# Patient Record
Sex: MALE | Race: WHITE | NOT HISPANIC OR LATINO | Employment: OTHER | URBAN - METROPOLITAN AREA
[De-identification: names, ages, dates, MRNs, and addresses within clinical notes are randomized per-mention and may not be internally consistent; named-entity substitution may affect disease eponyms.]

---

## 2018-11-02 ENCOUNTER — HISTORICAL (OUTPATIENT)
Dept: ADMINISTRATIVE | Facility: HOSPITAL | Age: 38
End: 2018-11-02

## 2018-11-16 ENCOUNTER — HISTORICAL (OUTPATIENT)
Dept: ADMINISTRATIVE | Facility: HOSPITAL | Age: 38
End: 2018-11-16

## 2019-05-17 ENCOUNTER — HISTORICAL (OUTPATIENT)
Dept: RADIOLOGY | Facility: HOSPITAL | Age: 39
End: 2019-05-17

## 2019-05-17 LAB — POC CREATININE: 2 MG/DL (ref 0.6–1.3)

## 2021-09-02 ENCOUNTER — HISTORICAL (OUTPATIENT)
Dept: RADIOLOGY | Facility: HOSPITAL | Age: 41
End: 2021-09-02

## 2021-09-02 LAB
ABS NEUT (OLG): 5 X10(3)/MCL (ref 2.1–9.2)
ALBUMIN SERPL-MCNC: 2.9 GM/DL (ref 3.5–5)
ALBUMIN/GLOB SERPL: 1 RATIO (ref 1.1–2)
ALP SERPL-CCNC: 132 UNIT/L (ref 40–150)
ALT SERPL-CCNC: 10 UNIT/L (ref 0–55)
AST SERPL-CCNC: 15 UNIT/L (ref 5–34)
BASOPHILS # BLD AUTO: 0 X10(3)/MCL (ref 0–0.2)
BASOPHILS NFR BLD AUTO: 1 %
BILIRUB SERPL-MCNC: 0.1 MG/DL
BILIRUBIN DIRECT+TOT PNL SERPL-MCNC: <0.1 MG/DL (ref 0–0.5)
BILIRUBIN DIRECT+TOT PNL SERPL-MCNC: >0 MG/DL (ref 0–0.8)
BUN SERPL-MCNC: 27.4 MG/DL (ref 8.9–20.6)
CALCIUM SERPL-MCNC: 8.3 MG/DL (ref 8.4–10.2)
CHLORIDE SERPL-SCNC: 108 MMOL/L (ref 98–107)
CO2 SERPL-SCNC: 25 MMOL/L (ref 22–29)
CREAT SERPL-MCNC: 2.49 MG/DL (ref 0.73–1.18)
CREAT UR-MCNC: 139.1 MG/DL (ref 58–161)
EOSINOPHIL # BLD AUTO: 0.2 X10(3)/MCL (ref 0–0.9)
EOSINOPHIL NFR BLD AUTO: 3 %
ERYTHROCYTE [DISTWIDTH] IN BLOOD BY AUTOMATED COUNT: 14.4 % (ref 11.5–17)
FERRITIN SERPL-MCNC: 9.7 NG/ML (ref 21.81–274.66)
GLOBULIN SER-MCNC: 2.9 GM/DL (ref 2.4–3.5)
GLUCOSE SERPL-MCNC: 150 MG/DL (ref 74–100)
HCT VFR BLD AUTO: 30.3 % (ref 42–52)
HGB BLD-MCNC: 9.6 GM/DL (ref 14–18)
IMM GRANULOCYTES # BLD AUTO: 0.01 % (ref 0–0.02)
IMM GRANULOCYTES NFR BLD AUTO: 0.1 % (ref 0–0.43)
IRON SATN MFR SERPL: 8 % (ref 20–50)
IRON SERPL-MCNC: 25 UG/DL (ref 65–175)
LYMPHOCYTES # BLD AUTO: 0.9 X10(3)/MCL (ref 0.6–4.6)
LYMPHOCYTES NFR BLD AUTO: 14 %
MCH RBC QN AUTO: 28.2 PG (ref 27–31)
MCHC RBC AUTO-ENTMCNC: 31.7 GM/DL (ref 33–36)
MCV RBC AUTO: 88.9 FL (ref 80–94)
MONOCYTES # BLD AUTO: 0.6 X10(3)/MCL (ref 0.1–1.3)
MONOCYTES NFR BLD AUTO: 9 %
NEUTROPHILS # BLD AUTO: 5 X10(3)/MCL (ref 1.4–7.9)
NEUTROPHILS NFR BLD AUTO: 74 %
PHOSPHATE SERPL-MCNC: 4.7 MG/DL (ref 2.3–4.7)
PLATELET # BLD AUTO: 269 X10(3)/MCL (ref 130–400)
PMV BLD AUTO: 10 FL (ref 9.4–12.4)
POTASSIUM SERPL-SCNC: 5.3 MMOL/L (ref 3.5–5.1)
PROT SERPL-MCNC: 5.8 GM/DL (ref 6.4–8.3)
PROT UR STRIP-MCNC: 739.9 MG/DL
PTH-INTACT SERPL-MCNC: 264 PG/ML (ref 8.7–77.1)
RBC # BLD AUTO: 3.41 X10(6)/MCL (ref 4.7–6.1)
SODIUM SERPL-SCNC: 139 MMOL/L (ref 136–145)
TIBC SERPL-MCNC: 285 UG/DL (ref 69–240)
TIBC SERPL-MCNC: 310 UG/DL (ref 250–450)
TRANSFERRIN SERPL-MCNC: 279 MG/DL (ref 174–364)
WBC # SPEC AUTO: 6.8 X10(3)/MCL (ref 4.5–11.5)

## 2021-11-12 ENCOUNTER — HISTORICAL (OUTPATIENT)
Dept: INFUSION THERAPY | Facility: HOSPITAL | Age: 41
End: 2021-11-12

## 2021-11-19 ENCOUNTER — HISTORICAL (OUTPATIENT)
Dept: INFUSION THERAPY | Facility: HOSPITAL | Age: 41
End: 2021-11-19

## 2021-11-19 LAB
HCT VFR BLD AUTO: 29.6 % (ref 42–52)
HGB BLD-MCNC: 9 GM/DL (ref 14–18)

## 2021-11-23 ENCOUNTER — HISTORICAL (OUTPATIENT)
Dept: INFUSION THERAPY | Facility: HOSPITAL | Age: 41
End: 2021-11-23

## 2021-11-30 ENCOUNTER — HISTORICAL (OUTPATIENT)
Dept: INFUSION THERAPY | Facility: HOSPITAL | Age: 41
End: 2021-11-30

## 2021-12-07 ENCOUNTER — HISTORICAL (OUTPATIENT)
Dept: INFUSION THERAPY | Facility: HOSPITAL | Age: 41
End: 2021-12-07

## 2021-12-13 ENCOUNTER — HISTORICAL (OUTPATIENT)
Dept: ADMINISTRATIVE | Facility: HOSPITAL | Age: 41
End: 2021-12-13

## 2021-12-13 LAB
ABS NEUT (OLG): 3.44 X10(3)/MCL (ref 2.1–9.2)
ALBUMIN SERPL-MCNC: 2.8 GM/DL (ref 3.5–5)
ALBUMIN/GLOB SERPL: 0.9 RATIO (ref 1.1–2)
ALP SERPL-CCNC: 153 UNIT/L (ref 40–150)
ALT SERPL-CCNC: 15 UNIT/L (ref 0–55)
ANISOCYTOSIS BLD QL SMEAR: 1
AST SERPL-CCNC: 19 UNIT/L (ref 5–34)
BASOPHILS # BLD AUTO: 0 X10(3)/MCL (ref 0–0.2)
BASOPHILS NFR BLD AUTO: 1 %
BILIRUB SERPL-MCNC: 0.1 MG/DL
BILIRUBIN DIRECT+TOT PNL SERPL-MCNC: 0 MG/DL (ref 0–0.8)
BILIRUBIN DIRECT+TOT PNL SERPL-MCNC: 0.1 MG/DL (ref 0–0.5)
BUN SERPL-MCNC: 38.8 MG/DL (ref 8.9–20.6)
CALCIUM SERPL-MCNC: 8 MG/DL (ref 8.7–10.5)
CHLORIDE SERPL-SCNC: 109 MMOL/L (ref 98–107)
CO2 SERPL-SCNC: 17 MMOL/L (ref 22–29)
CREAT SERPL-MCNC: 2.63 MG/DL (ref 0.73–1.18)
EOSINOPHIL # BLD AUTO: 0.1 X10(3)/MCL (ref 0–0.9)
EOSINOPHIL NFR BLD AUTO: 2 %
ERYTHROCYTE [DISTWIDTH] IN BLOOD BY AUTOMATED COUNT: 16.6 % (ref 11.5–17)
FERRITIN SERPL-MCNC: 13.58 NG/ML (ref 21.81–274.66)
GLOBULIN SER-MCNC: 3.2 GM/DL (ref 2.4–3.5)
GLUCOSE SERPL-MCNC: 357 MG/DL (ref 74–100)
HCT VFR BLD AUTO: 41.5 % (ref 42–52)
HGB BLD-MCNC: 12.1 GM/DL (ref 14–18)
IRON SATN MFR SERPL: ABNORMAL % (ref 20–50)
IRON SERPL-MCNC: 107 UG/DL (ref 65–175)
LYMPHOCYTES # BLD AUTO: 0.9 X10(3)/MCL (ref 0.6–4.6)
LYMPHOCYTES NFR BLD AUTO: 18 %
MACROCYTES BLD QL SMEAR: 1 /MCL
MCH RBC QN AUTO: 26.4 PG (ref 27–31)
MCHC RBC AUTO-ENTMCNC: 29.2 GM/DL (ref 33–36)
MCV RBC AUTO: 90.6 FL (ref 80–94)
MONOCYTES # BLD AUTO: 0.7 X10(3)/MCL (ref 0.1–1.3)
MONOCYTES NFR BLD AUTO: 13 %
NEUTROPHILS # BLD AUTO: 3.44 X10(3)/MCL (ref 2.1–9.2)
NEUTROPHILS NFR BLD AUTO: 66 %
PLATELET # BLD AUTO: 339 X10(3)/MCL (ref 130–400)
PLATELET # BLD EST: NORMAL 10*3/UL
PMV BLD AUTO: 10.5 FL (ref 7.4–10.4)
POTASSIUM SERPL-SCNC: 5.4 MMOL/L (ref 3.5–5.1)
PROT SERPL-MCNC: 6 GM/DL (ref 6.4–8.3)
RBC # BLD AUTO: 4.58 X10(6)/MCL (ref 4.7–6.1)
RET# (OHS): 0.13 X10^6/ML (ref 0.03–0.1)
RETICULOCYTE COUNT AUTOMATED (OLG): 2.3 % (ref 1.1–2.1)
SODIUM SERPL-SCNC: 138 MMOL/L (ref 136–145)
TIBC SERPL-MCNC: <132 UG/DL (ref 250–450)
TIBC SERPL-MCNC: <25 UG/DL (ref 69–240)
TRANSFERRIN SERPL-MCNC: 254 MG/DL (ref 174–364)
WBC # SPEC AUTO: 5.2 X10(3)/MCL (ref 4.5–11.5)

## 2022-04-11 ENCOUNTER — HISTORICAL (OUTPATIENT)
Dept: ADMINISTRATIVE | Facility: HOSPITAL | Age: 42
End: 2022-04-11
Payer: MEDICARE

## 2022-04-22 DIAGNOSIS — N18.4 CKD (CHRONIC KIDNEY DISEASE) STAGE 4, GFR 15-29 ML/MIN: Primary | ICD-10-CM

## 2022-04-22 DIAGNOSIS — E11.9 DIABETES MELLITUS WITHOUT COMPLICATION: ICD-10-CM

## 2022-04-28 VITALS
SYSTOLIC BLOOD PRESSURE: 120 MMHG | DIASTOLIC BLOOD PRESSURE: 70 MMHG | OXYGEN SATURATION: 97 % | HEIGHT: 61 IN | BODY MASS INDEX: 19.9 KG/M2 | WEIGHT: 105.38 LBS

## 2022-05-01 DIAGNOSIS — I12.9 BENIGN HYPERTENSION WITH CHRONIC KIDNEY DISEASE, STAGE III: Primary | ICD-10-CM

## 2022-05-01 DIAGNOSIS — N18.30 CKD (CHRONIC KIDNEY DISEASE), STAGE III: ICD-10-CM

## 2022-05-01 DIAGNOSIS — N18.30 BENIGN HYPERTENSION WITH CHRONIC KIDNEY DISEASE, STAGE III: Primary | ICD-10-CM

## 2022-06-16 DIAGNOSIS — N18.4 CKD (CHRONIC KIDNEY DISEASE) STAGE 4, GFR 15-29 ML/MIN: Primary | ICD-10-CM

## 2022-06-20 NOTE — TELEPHONE ENCOUNTER
Called and spoke with Bessie patients mother regarding lab results, Potassium is elevated, Dr Watkins ordered Xopnabz31 gm daily and low Potassium diet, sent Lokelma to pharmacy on file. Verbalized understanding.

## 2022-06-21 RX ORDER — ESCITALOPRAM OXALATE 20 MG/1
20 TABLET ORAL DAILY
COMMUNITY
Start: 2022-04-26 | End: 2022-07-28

## 2022-06-21 RX ORDER — GABAPENTIN 400 MG/1
400 CAPSULE ORAL 2 TIMES DAILY
COMMUNITY
Start: 2022-06-02

## 2022-06-21 RX ORDER — AMLODIPINE BESYLATE 5 MG/1
5 TABLET ORAL DAILY
COMMUNITY
Start: 2022-01-18 | End: 2022-07-28

## 2022-06-21 RX ORDER — LORAZEPAM 0.5 MG/1
0.5 TABLET ORAL 2 TIMES DAILY
COMMUNITY
Start: 2022-04-26 | End: 2022-07-28

## 2022-06-21 RX ORDER — INSULIN GLARGINE 100 [IU]/ML
8 INJECTION, SOLUTION SUBCUTANEOUS DAILY
COMMUNITY

## 2022-06-21 RX ORDER — SIMVASTATIN 20 MG/1
20 TABLET, FILM COATED ORAL DAILY
COMMUNITY
Start: 2021-11-08 | End: 2022-07-28

## 2022-06-21 RX ORDER — PANTOPRAZOLE SODIUM 40 MG/1
40 TABLET, DELAYED RELEASE ORAL 2 TIMES DAILY
COMMUNITY
Start: 2021-08-12 | End: 2022-07-28

## 2022-06-21 RX ORDER — PROMETHAZINE HYDROCHLORIDE 25 MG/1
25 TABLET ORAL EVERY 6 HOURS PRN
COMMUNITY
Start: 2022-06-16

## 2022-06-21 RX ORDER — NITROFURANTOIN (MACROCRYSTALS) 100 MG/1
100 CAPSULE ORAL DAILY
COMMUNITY
Start: 2022-06-16

## 2022-06-21 RX ORDER — CLONIDINE HYDROCHLORIDE 0.1 MG/1
0.1 TABLET ORAL 2 TIMES DAILY
COMMUNITY
Start: 2022-06-16 | End: 2022-09-01

## 2022-06-21 RX ORDER — HYDROCODONE BITARTRATE AND ACETAMINOPHEN 10; 325 MG/1; MG/1
1 TABLET ORAL EVERY 6 HOURS PRN
Status: ON HOLD | COMMUNITY
Start: 2022-06-16 | End: 2022-10-10 | Stop reason: HOSPADM

## 2022-06-21 RX ORDER — SODIUM BICARBONATE 650 MG/1
1300 TABLET ORAL 2 TIMES DAILY
COMMUNITY
Start: 2022-01-18 | End: 2022-06-28

## 2022-06-21 RX ORDER — INSULIN ASPART 100 [IU]/ML
INJECTION, SOLUTION INTRAVENOUS; SUBCUTANEOUS 3 TIMES DAILY
COMMUNITY
Start: 2022-04-26

## 2022-06-21 RX ORDER — TRAZODONE HYDROCHLORIDE 100 MG/1
100-200 TABLET ORAL DAILY
COMMUNITY
Start: 2022-06-16

## 2022-06-21 RX ORDER — FAMOTIDINE 20 MG/1
20 TABLET, FILM COATED ORAL 2 TIMES DAILY
COMMUNITY
Start: 2022-05-31

## 2022-06-28 ENCOUNTER — OFFICE VISIT (OUTPATIENT)
Dept: NEPHROLOGY | Facility: CLINIC | Age: 42
End: 2022-06-28
Payer: MEDICARE

## 2022-06-28 VITALS
HEART RATE: 88 BPM | WEIGHT: 106.94 LBS | SYSTOLIC BLOOD PRESSURE: 110 MMHG | HEIGHT: 61 IN | BODY MASS INDEX: 20.19 KG/M2 | RESPIRATION RATE: 20 BRPM | DIASTOLIC BLOOD PRESSURE: 82 MMHG | OXYGEN SATURATION: 95 % | TEMPERATURE: 98 F

## 2022-06-28 DIAGNOSIS — K31.9 GASTROPATHY: ICD-10-CM

## 2022-06-28 DIAGNOSIS — E87.5 HYPERKALEMIA: ICD-10-CM

## 2022-06-28 DIAGNOSIS — E11.8 DIABETES MELLITUS TYPE 2 WITH COMPLICATIONS: ICD-10-CM

## 2022-06-28 DIAGNOSIS — R80.9 PROTEINURIA, UNSPECIFIED TYPE: ICD-10-CM

## 2022-06-28 DIAGNOSIS — N31.9 BLADDER DYSFUNCTION: ICD-10-CM

## 2022-06-28 DIAGNOSIS — N18.4 CKD (CHRONIC KIDNEY DISEASE) STAGE 4, GFR 15-29 ML/MIN: Primary | ICD-10-CM

## 2022-06-28 DIAGNOSIS — I10 HYPERTENSION, UNSPECIFIED TYPE: ICD-10-CM

## 2022-06-28 PROCEDURE — 3074F PR MOST RECENT SYSTOLIC BLOOD PRESSURE < 130 MM HG: ICD-10-PCS | Mod: CPTII,S$GLB,, | Performed by: INTERNAL MEDICINE

## 2022-06-28 PROCEDURE — 3008F PR BODY MASS INDEX (BMI) DOCUMENTED: ICD-10-PCS | Mod: CPTII,S$GLB,, | Performed by: INTERNAL MEDICINE

## 2022-06-28 PROCEDURE — 3079F PR MOST RECENT DIASTOLIC BLOOD PRESSURE 80-89 MM HG: ICD-10-PCS | Mod: CPTII,S$GLB,, | Performed by: INTERNAL MEDICINE

## 2022-06-28 PROCEDURE — 99214 OFFICE O/P EST MOD 30 MIN: CPT | Mod: S$GLB,,, | Performed by: INTERNAL MEDICINE

## 2022-06-28 PROCEDURE — 3066F PR DOCUMENTATION OF TREATMENT FOR NEPHROPATHY: ICD-10-PCS | Mod: CPTII,S$GLB,, | Performed by: INTERNAL MEDICINE

## 2022-06-28 PROCEDURE — 99999 PR PBB SHADOW E&M-EST. PATIENT-LVL V: CPT | Mod: PBBFAC,,, | Performed by: INTERNAL MEDICINE

## 2022-06-28 PROCEDURE — 99214 PR OFFICE/OUTPT VISIT, EST, LEVL IV, 30-39 MIN: ICD-10-PCS | Mod: S$GLB,,, | Performed by: INTERNAL MEDICINE

## 2022-06-28 PROCEDURE — 3079F DIAST BP 80-89 MM HG: CPT | Mod: CPTII,S$GLB,, | Performed by: INTERNAL MEDICINE

## 2022-06-28 PROCEDURE — 3074F SYST BP LT 130 MM HG: CPT | Mod: CPTII,S$GLB,, | Performed by: INTERNAL MEDICINE

## 2022-06-28 PROCEDURE — 1159F MED LIST DOCD IN RCRD: CPT | Mod: CPTII,S$GLB,, | Performed by: INTERNAL MEDICINE

## 2022-06-28 PROCEDURE — 3008F BODY MASS INDEX DOCD: CPT | Mod: CPTII,S$GLB,, | Performed by: INTERNAL MEDICINE

## 2022-06-28 PROCEDURE — 99999 PR PBB SHADOW E&M-EST. PATIENT-LVL V: ICD-10-PCS | Mod: PBBFAC,,, | Performed by: INTERNAL MEDICINE

## 2022-06-28 PROCEDURE — 3066F NEPHROPATHY DOC TX: CPT | Mod: CPTII,S$GLB,, | Performed by: INTERNAL MEDICINE

## 2022-06-28 PROCEDURE — 1159F PR MEDICATION LIST DOCUMENTED IN MEDICAL RECORD: ICD-10-PCS | Mod: CPTII,S$GLB,, | Performed by: INTERNAL MEDICINE

## 2022-06-28 RX ORDER — IBUPROFEN 100 MG/5ML
1000 SUSPENSION, ORAL (FINAL DOSE FORM) ORAL DAILY
COMMUNITY

## 2022-06-28 RX ORDER — DIPHENOXYLATE HYDROCHLORIDE AND ATROPINE SULFATE 2.5; .025 MG/1; MG/1
1 TABLET ORAL 4 TIMES DAILY PRN
COMMUNITY
End: 2022-07-28

## 2022-06-28 RX ORDER — LANOLIN ALCOHOL/MO/W.PET/CERES
400 CREAM (GRAM) TOPICAL DAILY
COMMUNITY
End: 2022-07-28

## 2022-06-28 NOTE — PROGRESS NOTES
[facility] [place] Nephrology Ambulatory Progress Note      HPI  Randall Hernandez, 42 y.o. male,  has a past medical history of Anemia in CKD, Back pain, Blind, DM, Gastroparesis, diabetic neuropathy, diabetic vasculopathy, HTN, Neuropathy, Obesity, Suprapubic catheter, and Urine retention. 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 In 3/31/22 he demonstrated significant proteinuria at 9 g/24 hours. Labs this month are reading U. Protein >500 and U. Creatinine 121. He is not on ACEI/ARBS due to hyperkalemia and deteriorating renal function prior to April 2022. He is noted to have hyperkalemia with potassium of 5.5. He has started lokelma daily since this lab reading.    Mother bring results of MRI which he had completed for feeling off balance. See media tab for results scanned in.  Diabetic retinopathy; followed by ENT; no current plans for laser surgery    Patient denies taking NSAIDs, new antibiotics or recreational drugs. Denies recent episode of dehydration, diarrhea, vomiting, acute illness, hospitalization, recent angiograms or exposure to IV radiocontrast.      Medical History:   Past Medical History:   Diagnosis Date    Anemia in CKD (chronic kidney disease)     Back pain     Blind     DM (diabetes mellitus)     Gastroparesis     HTN (hypertension)     Neuropathy     Obesity     Suprapubic catheter     Urine retention        Surgical History:   Past Surgical History:   Procedure Laterality Date    CHOLECYSTECTOMY      DEBRIDEMENT      INSERTION OF SUPRAPUBIC CATHETER         Family History:   Family History   Problem Relation Age of Onset    Arrhythmia Mother     Hypertension Mother     Arrhythmia Father        Social History:   Social History     Tobacco Use    Smoking status: Current Every Day Smoker    Smokeless tobacco: Not on file   Substance Use Topics    Alcohol use: Not on file       Allergies:  Review of patient's allergies indicates:    Allergen Reactions    Ivp dye [iodinated contrast media]     Metoclopramide      Other reaction(s): weakness       Medications:    Current Outpatient Medications:     insulin glargine 100 units/mL (3mL) SubQ pen, Inject 17 Units into the skin Daily., Disp: , Rfl:     IRON, FERROUS SULFATE, ORAL, Take 1 tablet by mouth 3 (three) times daily., Disp: , Rfl:     pantoprazole (PROTONIX) 40 MG tablet, Take 40 mg by mouth 2 (two) times a day., Disp: , Rfl:     simvastatin (ZOCOR) 20 MG tablet, Take 20 mg by mouth Daily., Disp: , Rfl:     sodium bicarbonate 650 MG tablet, Take 1,300 mg by mouth 2 (two) times a day., Disp: , Rfl:     amLODIPine (NORVASC) 5 MG tablet, Take 5 mg by mouth once daily., Disp: , Rfl:     cloNIDine (CATAPRES) 0.1 MG tablet, Take 0.1 mg by mouth 2 (two) times daily., Disp: , Rfl:     EScitalopram oxalate (LEXAPRO) 20 MG tablet, Take 20 mg by mouth Daily., Disp: , Rfl:     famotidine (PEPCID) 20 MG tablet, Take 20 mg by mouth 2 (two) times daily., Disp: , Rfl:     gabapentin (NEURONTIN) 400 MG capsule, Take 400 mg by mouth 3 (three) times daily., Disp: , Rfl:     HYDROcodone-acetaminophen (NORCO)  mg per tablet, Take 1 tablet by mouth every 6 (six) hours as needed., Disp: , Rfl:     LORazepam (ATIVAN) 0.5 MG tablet, Take 0.5 mg by mouth 2 (two) times a day., Disp: , Rfl:     nitrofurantoin (MACRODANTIN) 100 MG capsule, Take 100 mg by mouth once daily., Disp: , Rfl:     NOVOLOG FLEXPEN U-100 INSULIN 100 unit/mL (3 mL) InPn pen, Inject 5 Units into the skin 3 (three) times daily., Disp: , Rfl:     promethazine (PHENERGAN) 25 MG tablet, Take 25 mg by mouth every 6 (six) hours as needed., Disp: , Rfl:     sodium zirconium cyclosilicate (LOKELMA) 10 gram packet, Take 1 packet (10 g total) by mouth once daily. Mix entire contents of packet(s) into drinking glass containing 3 tablespoons of water; stir well and drink immediately. Add water and repeat until no powder remains to  receive entire dose., Disp: 30 packet, Rfl: 0    traZODone (DESYREL) 100 MG tablet, Take 100-200 mg by mouth once daily., Disp: , Rfl:        ROS:    Constitutional: Denies fever, fatigue, generalized weakness, night sweats, or acute weight change  Skin: Denies wounds, no rashes, no itching, no new skin lesions  HEENT: Denies acute change in hearing or vision, tinnitus, or dysphagia; decreased vision (chronic)  Respiratory:  Denies cough, shortness of breath, or wheezing  Cardiovascular: Denies chest pain, palpitations, or swelling  Gastrointestional: Denies abdominal pain, nausea, vomiting, diarrhea, or constipation  Genitourinary: Denies dysuria, hematuria, or incontinence; reports able to empty bladder  Musculoskeletal: Denies myalgias, back pain, decreased ROM or focal weakness  Neurological: Denies headaches, seizures, vertigo and imbalance worsening  Hematological: Denies unusual bruising or bleeding  Psychiatric: Denies hallucinations, depression, or confusion      Vital Signs:      Vitals:    06/28/22 1412   BP: 110/82   Pulse: 88   Resp: 20   Temp: 97.9 °F (36.6 °C)     Body mass index is 20.2 kg/m².    Physical Exam:    General: no acute distress, awake, alert  Eyes: PERRLA, EOMI, conjunctiva clear, eyelids without swelling, decrease in peripheral vision  HENT: atraumatic, oropharynx and nasal mucosa patent  Neck: full ROM, no JVD, no thyromegaly or lymphadenopathy  Respiratory: equal, unlabored, clear to auscultation A/P  Cardiovascular: RRR without rub; radial and pedal pulses felt  Edema: none  Gastrointestinal: soft, non-tender, non-distended; positive bowel sounds; no masses to palpation, +cystostomy tube  Musculoskeletal: ROM without major limitation or discomfort  Integumentary: warm, dry; no rashes, wounds, or skin lesions  Neurological: oriented, appropriate,decrease peripheral vision and balance      Labs:  Most recent 6/17/22  H&H 14/43  Na 137  K 5.5  CO2 27  Cl 105  Cr 2.9  , increased  from 80s in March  Glucose 263  Ca 8.2  Phos 4.1  A1C 10.6  U. Protein >500  U. Creatinine 121    Impression:    1. CKD (chronic kidney disease) stage 4, GFR 15-29 ml/min     2. Hypertension, unspecified type     3. Diabetes mellitus type 2 with complications     4. Bladder dysfunction     5. Gastropathy     6. Proteinuria, unspecified type     7. Hyperkalemia         Uncontrolled diabetes   Hyperkalemia; on lokelma  diabetic retinopathy; followed by ENT  Abnormal MRI; plans to fu with neuro    Plan:  Mainline of management is control of diabetes and hypertension, patient has a big issues with compliance  Plans to FU with neurologist  Plans to find another endocrinologist  Discussed need to set up for fistula as he is heading in the direction of dialysis.  Encouraged compliance to medications  Labs in 3 weeks  FU in 4 weeks         Patric Watkins

## 2022-06-30 DIAGNOSIS — R93.89 ABNORMAL MRI: Primary | ICD-10-CM

## 2022-07-28 ENCOUNTER — OFFICE VISIT (OUTPATIENT)
Dept: NEPHROLOGY | Facility: CLINIC | Age: 42
End: 2022-07-28
Payer: MEDICARE

## 2022-07-28 VITALS
HEIGHT: 61 IN | DIASTOLIC BLOOD PRESSURE: 68 MMHG | BODY MASS INDEX: 20.2 KG/M2 | HEART RATE: 117 BPM | OXYGEN SATURATION: 98 % | TEMPERATURE: 98 F | RESPIRATION RATE: 20 BRPM | SYSTOLIC BLOOD PRESSURE: 100 MMHG

## 2022-07-28 DIAGNOSIS — I10 HYPERTENSION, UNSPECIFIED TYPE: ICD-10-CM

## 2022-07-28 DIAGNOSIS — E11.8 DM (DIABETES MELLITUS) WITH COMPLICATIONS: ICD-10-CM

## 2022-07-28 DIAGNOSIS — R80.9 PROTEINURIA, UNSPECIFIED TYPE: ICD-10-CM

## 2022-07-28 DIAGNOSIS — N18.4 CKD (CHRONIC KIDNEY DISEASE) STAGE 4, GFR 15-29 ML/MIN: Primary | ICD-10-CM

## 2022-07-28 PROCEDURE — 99999 PR PBB SHADOW E&M-EST. PATIENT-LVL IV: CPT | Mod: PBBFAC,,, | Performed by: INTERNAL MEDICINE

## 2022-07-28 PROCEDURE — 3008F BODY MASS INDEX DOCD: CPT | Mod: CPTII,S$GLB,, | Performed by: INTERNAL MEDICINE

## 2022-07-28 PROCEDURE — 99999 PR PBB SHADOW E&M-EST. PATIENT-LVL IV: ICD-10-PCS | Mod: PBBFAC,,, | Performed by: INTERNAL MEDICINE

## 2022-07-28 PROCEDURE — 3066F PR DOCUMENTATION OF TREATMENT FOR NEPHROPATHY: ICD-10-PCS | Mod: CPTII,S$GLB,, | Performed by: INTERNAL MEDICINE

## 2022-07-28 PROCEDURE — 3066F NEPHROPATHY DOC TX: CPT | Mod: CPTII,S$GLB,, | Performed by: INTERNAL MEDICINE

## 2022-07-28 PROCEDURE — 3078F PR MOST RECENT DIASTOLIC BLOOD PRESSURE < 80 MM HG: ICD-10-PCS | Mod: CPTII,S$GLB,, | Performed by: INTERNAL MEDICINE

## 2022-07-28 PROCEDURE — 3074F PR MOST RECENT SYSTOLIC BLOOD PRESSURE < 130 MM HG: ICD-10-PCS | Mod: CPTII,S$GLB,, | Performed by: INTERNAL MEDICINE

## 2022-07-28 PROCEDURE — 99214 OFFICE O/P EST MOD 30 MIN: CPT | Mod: S$GLB,,, | Performed by: INTERNAL MEDICINE

## 2022-07-28 PROCEDURE — 3008F PR BODY MASS INDEX (BMI) DOCUMENTED: ICD-10-PCS | Mod: CPTII,S$GLB,, | Performed by: INTERNAL MEDICINE

## 2022-07-28 PROCEDURE — 3078F DIAST BP <80 MM HG: CPT | Mod: CPTII,S$GLB,, | Performed by: INTERNAL MEDICINE

## 2022-07-28 PROCEDURE — 3074F SYST BP LT 130 MM HG: CPT | Mod: CPTII,S$GLB,, | Performed by: INTERNAL MEDICINE

## 2022-07-28 PROCEDURE — 1159F PR MEDICATION LIST DOCUMENTED IN MEDICAL RECORD: ICD-10-PCS | Mod: CPTII,S$GLB,, | Performed by: INTERNAL MEDICINE

## 2022-07-28 PROCEDURE — 1159F MED LIST DOCD IN RCRD: CPT | Mod: CPTII,S$GLB,, | Performed by: INTERNAL MEDICINE

## 2022-07-28 PROCEDURE — 99214 PR OFFICE/OUTPT VISIT, EST, LEVL IV, 30-39 MIN: ICD-10-PCS | Mod: S$GLB,,, | Performed by: INTERNAL MEDICINE

## 2022-07-28 NOTE — PROGRESS NOTES
"  [facility] [place] Nephrology Ambulatory Progress Note      HPI  Randall Hernandez, 42 y.o. male,  has a past medical history of Anemia in CKD (chronic kidney disease), Back pain, Blind, DM (diabetes mellitus), Gastroparesis, HTN (hypertension), Neuropathy, Obesity, Suprapubic catheter, and Urine retention. 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     Diabetic retinopathy; followed by ENT; no current plans for laser surgery.    He is not on ACEI/ARBS due to hyperkalemia and deteriorating renal function.  April 2022 had hyperkalemia with potassium of 5.5. He was started lokelma for a short period of time; most recent K now 3.4    Glucose at home is "up and down"    He saw vascular surgeon yesterday and has plans for fistula 8/10/22    He is also being followed by neurology due to abnormal MRI results last month, treated for presumed demyelinating disease      Today he feels more weak than normal. BP on lower end.  Has occasional difficulty in ambulation however seems alert and responsive  Unfortunately patient is still smoking a pack a day at least despite all events      Patient denies taking NSAIDs, new antibiotics or recreational drugs. Denies recent episode of dehydration, diarrhea, vomiting, acute illness, hospitalization, recent angiograms or exposure to IV radiocontrast.      Medical History:   Past Medical History:   Diagnosis Date    Anemia in CKD (chronic kidney disease)     Back pain     Blind     DM (diabetes mellitus)     Gastroparesis     HTN (hypertension)     Neuropathy     Obesity     Suprapubic catheter     Urine retention        Surgical History:   Past Surgical History:   Procedure Laterality Date    CHOLECYSTECTOMY      DEBRIDEMENT      INSERTION OF SUPRAPUBIC CATHETER         Family History:   Family History   Problem Relation Age of Onset    Arrhythmia Mother     Hypertension Mother     Arrhythmia Father        Social History:   Social " History     Tobacco Use    Smoking status: Current Every Day Smoker    Smokeless tobacco: Never Used   Substance Use Topics    Alcohol use: Never       Allergies:  Review of patient's allergies indicates:   Allergen Reactions    Ivp dye [iodinated contrast media]     Metoclopramide      Other reaction(s): weakness    Ceftriaxone Rash     Other reaction(s): Other (See Comments)  Joint pain, chest tightness, numbness: hands, feet, throat, blurred vision, lightheaded   Other reaction(s): Other (See Comments)  Joint pain, chest tightness, numbness: hands, feet, throat, blurred vision, lightheaded   Joint pain, chest tightness, numbness: hands, feet, throat, blurred vision, lightheaded   Other reaction(s): Other (See Comments)  Joint pain, chest tightness, numbness: hands, feet, throat, blurred vision, lightheaded          Medications:    Current Outpatient Medications:     ascorbic acid, vitamin C, (VITAMIN C) 1000 MG tablet, Take 1,000 mg by mouth once daily., Disp: , Rfl:     cloNIDine (CATAPRES) 0.1 MG tablet, Take 0.1 mg by mouth 2 (two) times daily., Disp: , Rfl:     famotidine (PEPCID) 20 MG tablet, Take 20 mg by mouth 2 (two) times daily., Disp: , Rfl:     gabapentin (NEURONTIN) 400 MG capsule, Take 400 mg by mouth 3 (three) times daily., Disp: , Rfl:     HYDROcodone-acetaminophen (NORCO)  mg per tablet, Take 1 tablet by mouth every 6 (six) hours as needed., Disp: , Rfl:     insulin glargine 100 units/mL (3mL) SubQ pen, Inject 17 Units into the skin Daily. And 10 units at night, Disp: , Rfl:     nitrofurantoin (MACRODANTIN) 100 MG capsule, Take 100 mg by mouth once daily., Disp: , Rfl:     NOVOLOG FLEXPEN U-100 INSULIN 100 unit/mL (3 mL) InPn pen, Inject into the skin 3 (three) times daily. Per sliding scale, Disp: , Rfl:     promethazine (PHENERGAN) 25 MG tablet, Take 25 mg by mouth every 6 (six) hours as needed., Disp: , Rfl:     traZODone (DESYREL) 100 MG tablet, Take 100-200 mg by mouth  "once daily., Disp: , Rfl:        ROS:    Constitutional:  Current generalized weakness and fatigue and difficulty in ambulation as I mentioned followed up by Neurology in [facility]  Skin: Denies wounds, no rashes, no itching, no new skin lesions  HEENT: Denies acute change in hearing , significant decreased peripheral vision related to advanced diabetic retinopathy  Respiratory:  Denies cough, shortness of breath, or wheezing  Cardiovascular: Denies chest pain, palpitations, or swelling  Gastrointestional: Denies abdominal pain, nausea, vomiting, diarrhea, or constipation,   Genitourinary: has a suprapubic catheter  Musculoskeletal:  Lower extremity weakness requiring cane for ambulation  Neurological: Denies headaches, seizures, dizziness intermittently  Hematological: Denies unusual bruising or bleeding   Psychiatric: Denies hallucinations, depression, or confusion      Vital Signs:  /68 (BP Location: Left arm, Patient Position: Sitting)   Pulse (!) 117   Temp 98.2 °F (36.8 °C) (Oral)   Resp 20   Ht 5' 1" (1.549 m)   SpO2 98%   BMI 20.20 kg/m²   Body mass index is 20.2 kg/m².      Physical Exam:    General:  Chronically ill but no acute respiratory distress  Eyes: PERRLA, EOMI, conjunctiva clear, decreased peripheral vision  HENT: atraumatic, oropharynx and nasal mucosa patent  Neck: full ROM, no JVD, no thyromegaly or lymphadenopathy  Respiratory: equal, unlabored, rhonchi and end-expiratory wheezing  Cardiovascular: RRR withoutrub; radial and pedal pulses felt  Edema: none  Gastrointestinal: soft, non-tender, non-distended; positive bowel sounds; no masses to palpation,   Genitourinary: suprapubic catheter identified with urine  Musculoskeletal:  Lower extremity weakness symmetrically bilaterally requiring cane for ambulation  Integumentary: warm, dry; no rashes, wounds, or skin lesions, multiple tattoos in the upper and lower extremities  Neurological: oriented, decreased peripheral vision bilateral " symmetric lower extremity weakness      Labs:    See media tab for full lab results      H&H 14/42  K3.4  Na 139  Phos 3.4  Glucose 210  Ca 8.0  Alb 2.1  Cr 3.09    Impression:      1. CKD (chronic kidney disease) stage 4, GFR 15-29 ml/min     2. Hypertension, unspecified type     3. DM (diabetes mellitus) with complications     4. Proteinuria, unspecified type     5. 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: Creatinine in expected range for progression of dx  BP on lower side; currently taking clonidine 0.1 mg BID  Smoker  H&H elevated; likely due to smoking; has not received any FABRIZIO therapy for at least 4 months    Significant advanced diabetic retinopathy diabetic vasculopathy  Questionable demyelinating disease  Advanced diabetic neuropathy with difficulty in ambulation  Unfortunately still smoking despite all of these issues    Plan:  Decrease clonidine to only 0.1mg at bedtime, if it goes higher than 140/80 take twice a day    Avoid NSAIDs (Aleve, Mobic, Celebrex, Ibuprofen, Advil, Toradol and Diclofenac).    Patient already had vascular evaluation for AV fistula or graft  Hopefully planning to do it in the near future patient understands that he is going to end up needing dialysis  In view of progression of his kidney disease and monitor him closer and following up in 4 weeks    ENCOURAGED SMOKING CESSATION    Patric Watkins

## 2022-08-19 ENCOUNTER — TELEPHONE (OUTPATIENT)
Dept: NEPHROLOGY | Facility: CLINIC | Age: 42
End: 2022-08-19
Payer: MEDICARE

## 2022-08-19 DIAGNOSIS — N18.4 CKD (CHRONIC KIDNEY DISEASE) STAGE 4, GFR 15-29 ML/MIN: Primary | ICD-10-CM

## 2022-08-19 RX ORDER — AMLODIPINE BESYLATE 5 MG/1
5 TABLET ORAL DAILY
Qty: 30 TABLET | Refills: 11 | Status: SHIPPED | OUTPATIENT
Start: 2022-08-19 | End: 2022-09-16

## 2022-08-19 NOTE — TELEPHONE ENCOUNTER
Mother Bessie called regarding patients B/P elevated, only giving patient 1/2 of Clonidine 0.1 mg due to dropping B/P too low.  Mother stated that she gave him Amlodipine/ Benazepril 5/10 mg today. Spoke with Dr Watkins, ordered Amlodipine 5 mg daily, sent to pharmacy on file. Verbalized understanding.

## 2022-09-01 ENCOUNTER — OFFICE VISIT (OUTPATIENT)
Dept: NEPHROLOGY | Facility: CLINIC | Age: 42
End: 2022-09-01
Payer: MEDICARE

## 2022-09-01 VITALS
TEMPERATURE: 99 F | SYSTOLIC BLOOD PRESSURE: 132 MMHG | HEART RATE: 107 BPM | WEIGHT: 102.06 LBS | RESPIRATION RATE: 20 BRPM | HEIGHT: 61 IN | DIASTOLIC BLOOD PRESSURE: 78 MMHG | BODY MASS INDEX: 19.27 KG/M2 | OXYGEN SATURATION: 98 %

## 2022-09-01 DIAGNOSIS — R80.9 PROTEINURIA, UNSPECIFIED TYPE: ICD-10-CM

## 2022-09-01 DIAGNOSIS — I10 HYPERTENSION, UNSPECIFIED TYPE: ICD-10-CM

## 2022-09-01 DIAGNOSIS — N18.4 CKD (CHRONIC KIDNEY DISEASE) STAGE 4, GFR 15-29 ML/MIN: Primary | ICD-10-CM

## 2022-09-01 DIAGNOSIS — E11.8 DM (DIABETES MELLITUS) WITH COMPLICATIONS: ICD-10-CM

## 2022-09-01 PROCEDURE — 3008F PR BODY MASS INDEX (BMI) DOCUMENTED: ICD-10-PCS | Mod: CPTII,S$GLB,, | Performed by: INTERNAL MEDICINE

## 2022-09-01 PROCEDURE — 3078F PR MOST RECENT DIASTOLIC BLOOD PRESSURE < 80 MM HG: ICD-10-PCS | Mod: CPTII,S$GLB,, | Performed by: INTERNAL MEDICINE

## 2022-09-01 PROCEDURE — 3008F BODY MASS INDEX DOCD: CPT | Mod: CPTII,S$GLB,, | Performed by: INTERNAL MEDICINE

## 2022-09-01 PROCEDURE — 1159F PR MEDICATION LIST DOCUMENTED IN MEDICAL RECORD: ICD-10-PCS | Mod: CPTII,S$GLB,, | Performed by: INTERNAL MEDICINE

## 2022-09-01 PROCEDURE — 3066F NEPHROPATHY DOC TX: CPT | Mod: CPTII,S$GLB,, | Performed by: INTERNAL MEDICINE

## 2022-09-01 PROCEDURE — 99999 PR PBB SHADOW E&M-EST. PATIENT-LVL IV: CPT | Mod: PBBFAC,,, | Performed by: INTERNAL MEDICINE

## 2022-09-01 PROCEDURE — 3078F DIAST BP <80 MM HG: CPT | Mod: CPTII,S$GLB,, | Performed by: INTERNAL MEDICINE

## 2022-09-01 PROCEDURE — 1159F MED LIST DOCD IN RCRD: CPT | Mod: CPTII,S$GLB,, | Performed by: INTERNAL MEDICINE

## 2022-09-01 PROCEDURE — 99214 OFFICE O/P EST MOD 30 MIN: CPT | Mod: S$GLB,,, | Performed by: INTERNAL MEDICINE

## 2022-09-01 PROCEDURE — 99214 PR OFFICE/OUTPT VISIT, EST, LEVL IV, 30-39 MIN: ICD-10-PCS | Mod: S$GLB,,, | Performed by: INTERNAL MEDICINE

## 2022-09-01 PROCEDURE — 3075F PR MOST RECENT SYSTOLIC BLOOD PRESS GE 130-139MM HG: ICD-10-PCS | Mod: CPTII,S$GLB,, | Performed by: INTERNAL MEDICINE

## 2022-09-01 PROCEDURE — 99999 PR PBB SHADOW E&M-EST. PATIENT-LVL IV: ICD-10-PCS | Mod: PBBFAC,,, | Performed by: INTERNAL MEDICINE

## 2022-09-01 PROCEDURE — 3075F SYST BP GE 130 - 139MM HG: CPT | Mod: CPTII,S$GLB,, | Performed by: INTERNAL MEDICINE

## 2022-09-01 PROCEDURE — 3066F PR DOCUMENTATION OF TREATMENT FOR NEPHROPATHY: ICD-10-PCS | Mod: CPTII,S$GLB,, | Performed by: INTERNAL MEDICINE

## 2022-09-01 RX ORDER — CARVEDILOL 6.25 MG/1
6.25 TABLET ORAL 2 TIMES DAILY WITH MEALS
Qty: 60 TABLET | Refills: 11 | Status: ON HOLD | OUTPATIENT
Start: 2022-09-01 | End: 2022-10-10 | Stop reason: HOSPADM

## 2022-09-01 RX ORDER — DIPHENOXYLATE HYDROCHLORIDE AND ATROPINE SULFATE 2.5; .025 MG/1; MG/1
TABLET ORAL
COMMUNITY
Start: 2022-01-17

## 2022-09-01 NOTE — PROGRESS NOTES
[facility] [place] Nephrology Ambulatory Progress Note      HPI  Randall Hernandez, 42 y.o. male,  has a past medical history of Anemia in CKD (chronic kidney disease), Back pain, Blind, DM (diabetes mellitus), Gastroparesis, HTN (hypertension), Neuropathy, Obesity, Suprapubic catheter, and Urine retention. Randall Hernandez presents to office for a 1 month follow up visit for CKD4 related to severe diabetic nephropathy.  Diabetic retinopathy; followed by ENT; no current plans for laser surgery. Followed by neurology due to abnormal MRI results, treating for presumed demyelinating disease     He is not on ACEI/ARBS due to hyperkalemia and deteriorating renal function. Potassium currently WNL.     Fistula placed to Left lower forearm Friday. He c/o some swelling and pain, which is expected.    Mom reports he has been sleepy after taking clonidine for BP.    Patient denies taking NSAIDs, new antibiotics or recreational drugs. Denies recent episode of dehydration, diarrhea, vomiting, acute illness, hospitalization, recent angiograms or exposure to IV radiocontrast.      Medical History:   Past Medical History:   Diagnosis Date    Anemia in CKD (chronic kidney disease)     Back pain     Blind     DM (diabetes mellitus)     Gastroparesis     HTN (hypertension)     Neuropathy     Obesity     Suprapubic catheter     Urine retention        Surgical History:   Past Surgical History:   Procedure Laterality Date    CHOLECYSTECTOMY      DEBRIDEMENT      INSERTION OF SUPRAPUBIC CATHETER         Family History:   Family History   Problem Relation Age of Onset    Arrhythmia Mother     Hypertension Mother     Arrhythmia Father        Social History:   Social History     Tobacco Use    Smoking status: Every Day    Smokeless tobacco: Never   Substance Use Topics    Alcohol use: Never       Allergies:  Review of patient's allergies indicates:   Allergen Reactions    Ivp dye [iodinated contrast media]     Metoclopramide      Other reaction(s): weakness     Ceftriaxone Rash     Other reaction(s): Other (See Comments)  Joint pain, chest tightness, numbness: hands, feet, throat, blurred vision, lightheaded   Other reaction(s): Other (See Comments)  Joint pain, chest tightness, numbness: hands, feet, throat, blurred vision, lightheaded   Joint pain, chest tightness, numbness: hands, feet, throat, blurred vision, lightheaded   Other reaction(s): Other (See Comments)  Joint pain, chest tightness, numbness: hands, feet, throat, blurred vision, lightheaded          Medications:    Current Outpatient Medications:     amLODIPine (NORVASC) 5 MG tablet, Take 1 tablet (5 mg total) by mouth once daily., Disp: 30 tablet, Rfl: 11    ascorbic acid, vitamin C, (VITAMIN C) 1000 MG tablet, Take 1,000 mg by mouth once daily., Disp: , Rfl:     cloNIDine (CATAPRES) 0.1 MG tablet, Take 0.1 mg by mouth 2 (two) times daily., Disp: , Rfl:     famotidine (PEPCID) 20 MG tablet, Take 20 mg by mouth 2 (two) times daily., Disp: , Rfl:     gabapentin (NEURONTIN) 400 MG capsule, Take 400 mg by mouth 3 (three) times daily., Disp: , Rfl:     HYDROcodone-acetaminophen (NORCO)  mg per tablet, Take 1 tablet by mouth every 6 (six) hours as needed., Disp: , Rfl:     insulin glargine 100 units/mL (3mL) SubQ pen, Inject 17 Units into the skin Daily. And 10 units at night, Disp: , Rfl:     nitrofurantoin (MACRODANTIN) 100 MG capsule, Take 100 mg by mouth once daily., Disp: , Rfl:     NOVOLOG FLEXPEN U-100 INSULIN 100 unit/mL (3 mL) InPn pen, Inject into the skin 3 (three) times daily. Per sliding scale, Disp: , Rfl:     promethazine (PHENERGAN) 25 MG tablet, Take 25 mg by mouth every 6 (six) hours as needed., Disp: , Rfl:     traZODone (DESYREL) 100 MG tablet, Take 100-200 mg by mouth once daily., Disp: , Rfl:        ROS:    Constitutional: Denies fever, fatigue, generalized weakness, night sweats, or acute weight change  Skin: Denies wounds, no rashes, no itching, no new skin lesions  HEENT: Denies acute  change in hearing or vision, tinnitus, or dysphagia  Respiratory:  Denies cough, shortness of breath, or wheezing  Cardiovascular: Denies chest pain, palpitations, or swelling  Gastrointestional: Denies abdominal pain, nausea, vomiting, diarrhea, or constipation  Genitourinary: Denies dysuria, hematuria, or incontinence; reports able to empty bladder  Musculoskeletal: Denies myalgias, back pain, decreased ROM or focal weakness, LUE edema  Neurological: Denies headaches, seizures, dizziness, paresthesias or weakness  Hematological: Denies unusual bruising or bleeding  Psychiatric: Denies hallucinations, depression, or confusion      Vital Signs:  Vitals:    09/01/22 1111   BP: 132/78   Pulse: 107   Resp: 20   Temp: 98.8 °F (37.1 °C)     Body mass index is 19.29 kg/m².         Physical Exam:    General: no acute distress, awake, alert  Eyes: PERRLA, EOMI, conjunctiva clear, eyelids without swelling, dec peripheral vision  HENT: atraumatic, oropharynx and nasal mucosa patent  Neck: full ROM, no JVD, no thyromegaly or lymphadenopathy  Respiratory: equal, unlabored, clear to auscultation A/P  Cardiovascular: RRR without murmur or rub; radial and pedal pulses felt  Edema: none  Gastrointestinal: soft, non-tender, non-distended; positive bowel sounds; no masses to palpation  Genitourinary: no CVA tenderness upon palpation  Musculoskeletal: full ROM without limitation or discomfort  Integumentary: warm, dry; no rashes, wounds, or skin lesions  Neurological: oriented, appropriate, no acute deficits  Dialysis access: left lower forearm: +bruit; slight redness at site, it was just placed Friday    Labs:  See media for full lab results  8/24/22:  Cr 2.7 (3.0 on 7/22/22)  BUN 22  K 4.5  Na 136  Co2 30  Ca 7.9  Phos 4.8   (261 on 7/22/22)  Proteinuria 10g/24 hours  H&H 11/34 (14/40 on 7/22/22)    Impression:    1. CKD (chronic kidney disease) stage 4, GFR 15-29 ml/min        2. Hypertension, unspecified type        3. DM  (diabetes mellitus) with complications        4. Proteinuria, unspecified type        5. 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       Patric Watkins MD

## 2022-09-16 ENCOUNTER — OFFICE VISIT (OUTPATIENT)
Dept: NEPHROLOGY | Facility: CLINIC | Age: 42
End: 2022-09-16
Payer: MEDICARE

## 2022-09-16 VITALS
TEMPERATURE: 98 F | BODY MASS INDEX: 18.81 KG/M2 | WEIGHT: 99.63 LBS | HEART RATE: 100 BPM | DIASTOLIC BLOOD PRESSURE: 60 MMHG | SYSTOLIC BLOOD PRESSURE: 94 MMHG | OXYGEN SATURATION: 98 % | HEIGHT: 61 IN | RESPIRATION RATE: 20 BRPM

## 2022-09-16 DIAGNOSIS — R80.9 PROTEINURIA, UNSPECIFIED TYPE: ICD-10-CM

## 2022-09-16 DIAGNOSIS — E11.8 DM (DIABETES MELLITUS) WITH COMPLICATIONS: ICD-10-CM

## 2022-09-16 DIAGNOSIS — E87.5 HYPERKALEMIA: ICD-10-CM

## 2022-09-16 DIAGNOSIS — I10 HYPERTENSION, UNSPECIFIED TYPE: ICD-10-CM

## 2022-09-16 DIAGNOSIS — N18.4 CKD (CHRONIC KIDNEY DISEASE) STAGE 4, GFR 15-29 ML/MIN: Primary | ICD-10-CM

## 2022-09-16 PROCEDURE — 1159F PR MEDICATION LIST DOCUMENTED IN MEDICAL RECORD: ICD-10-PCS | Mod: CPTII,S$GLB,, | Performed by: INTERNAL MEDICINE

## 2022-09-16 PROCEDURE — 99999 PR PBB SHADOW E&M-EST. PATIENT-LVL IV: ICD-10-PCS | Mod: PBBFAC,,, | Performed by: INTERNAL MEDICINE

## 2022-09-16 PROCEDURE — 99214 PR OFFICE/OUTPT VISIT, EST, LEVL IV, 30-39 MIN: ICD-10-PCS | Mod: S$GLB,,, | Performed by: INTERNAL MEDICINE

## 2022-09-16 PROCEDURE — 3074F SYST BP LT 130 MM HG: CPT | Mod: CPTII,S$GLB,, | Performed by: INTERNAL MEDICINE

## 2022-09-16 PROCEDURE — 3008F BODY MASS INDEX DOCD: CPT | Mod: CPTII,S$GLB,, | Performed by: INTERNAL MEDICINE

## 2022-09-16 PROCEDURE — 3074F PR MOST RECENT SYSTOLIC BLOOD PRESSURE < 130 MM HG: ICD-10-PCS | Mod: CPTII,S$GLB,, | Performed by: INTERNAL MEDICINE

## 2022-09-16 PROCEDURE — 3066F NEPHROPATHY DOC TX: CPT | Mod: CPTII,S$GLB,, | Performed by: INTERNAL MEDICINE

## 2022-09-16 PROCEDURE — 99999 PR PBB SHADOW E&M-EST. PATIENT-LVL IV: CPT | Mod: PBBFAC,,, | Performed by: INTERNAL MEDICINE

## 2022-09-16 PROCEDURE — 1159F MED LIST DOCD IN RCRD: CPT | Mod: CPTII,S$GLB,, | Performed by: INTERNAL MEDICINE

## 2022-09-16 PROCEDURE — 99214 OFFICE O/P EST MOD 30 MIN: CPT | Mod: S$GLB,,, | Performed by: INTERNAL MEDICINE

## 2022-09-16 PROCEDURE — 3066F PR DOCUMENTATION OF TREATMENT FOR NEPHROPATHY: ICD-10-PCS | Mod: CPTII,S$GLB,, | Performed by: INTERNAL MEDICINE

## 2022-09-16 PROCEDURE — 3078F PR MOST RECENT DIASTOLIC BLOOD PRESSURE < 80 MM HG: ICD-10-PCS | Mod: CPTII,S$GLB,, | Performed by: INTERNAL MEDICINE

## 2022-09-16 PROCEDURE — 3078F DIAST BP <80 MM HG: CPT | Mod: CPTII,S$GLB,, | Performed by: INTERNAL MEDICINE

## 2022-09-16 PROCEDURE — 3008F PR BODY MASS INDEX (BMI) DOCUMENTED: ICD-10-PCS | Mod: CPTII,S$GLB,, | Performed by: INTERNAL MEDICINE

## 2022-09-16 RX ORDER — DOXYCYCLINE 100 MG/1
100 CAPSULE ORAL 2 TIMES DAILY
Status: ON HOLD | COMMUNITY
Start: 2022-09-14 | End: 2022-10-10 | Stop reason: HOSPADM

## 2022-09-16 NOTE — PROGRESS NOTES
[facility] Progress Note      HPI  Randall Hernandez, 42 y.o. male,  has a past medical history of Anemia in CKD (chronic kidney disease), Back pain, Blind, DM (diabetes mellitus), Gastroparesis, HTN (hypertension), Neuropathy, Obesity, Suprapubic catheter, and Urine retention. Randall Hernandez presents to office for a follow up visit for CKD4 related to severe diabetic nephropathy.  Diabetic retinopathy; followed by ENT; no current plans for laser surgery. He is not on ACEI/ARBS due to hyperkalemia and deteriorating renal function. Potassium currently WNL.  Fistula placed to Left lower forearm 3 weeks ago.    Recent ER visit for blurry vision and infection to new AV fistula site; he was placed on antibiotic. doxycycline   Gabapentin 400 mg TID started 2 days ago    Patient denies taking NSAIDs, new antibiotics or recreational drugs. Denies recent episode of dehydration, diarrhea, vomiting, recent angiograms or exposure to IV radiocontrast.      Medical History:   Past Medical History:   Diagnosis Date    Anemia in CKD (chronic kidney disease)     Back pain     Blind     DM (diabetes mellitus)     Gastroparesis     HTN (hypertension)     Neuropathy     Obesity     Suprapubic catheter     Urine retention        Surgical History:   Past Surgical History:   Procedure Laterality Date    AV FISTULA PLACEMENT      CHOLECYSTECTOMY      DEBRIDEMENT      INSERTION OF SUPRAPUBIC CATHETER         Family History:   Family History   Problem Relation Age of Onset    Arrhythmia Mother     Hypertension Mother     Arrhythmia Father        Social History:   Social History     Tobacco Use    Smoking status: Every Day    Smokeless tobacco: Never   Substance Use Topics    Alcohol use: Never       Allergies:  Review of patient's allergies indicates:   Allergen Reactions    Ivp dye [iodinated contrast media]     Metoclopramide      Other reaction(s): weakness    Ceftriaxone Rash     Other reaction(s): Other (See Comments)  Joint  pain, chest tightness, numbness: hands, feet, throat, blurred vision, lightheaded   Other reaction(s): Other (See Comments)  Joint pain, chest tightness, numbness: hands, feet, throat, blurred vision, lightheaded   Joint pain, chest tightness, numbness: hands, feet, throat, blurred vision, lightheaded   Other reaction(s): Other (See Comments)  Joint pain, chest tightness, numbness: hands, feet, throat, blurred vision, lightheaded          Medications:    Current Outpatient Medications:     amLODIPine (NORVASC) 5 MG tablet, Take 1 tablet (5 mg total) by mouth once daily., Disp: 30 tablet, Rfl: 11    ascorbic acid, vitamin C, (VITAMIN C) 1000 MG tablet, Take 1,000 mg by mouth once daily., Disp: , Rfl:     carvediloL (COREG) 6.25 MG tablet, Take 1 tablet (6.25 mg total) by mouth 2 (two) times daily with meals., Disp: 60 tablet, Rfl: 11    diphenoxylate-atropine 2.5-0.025 mg (LOMOTIL) 2.5-0.025 mg per tablet, as needed., Disp: , Rfl:     famotidine (PEPCID) 20 MG tablet, Take 20 mg by mouth 2 (two) times daily., Disp: , Rfl:     gabapentin (NEURONTIN) 400 MG capsule, Take 400 mg by mouth 3 (three) times daily., Disp: , Rfl:     HYDROcodone-acetaminophen (NORCO)  mg per tablet, Take 1 tablet by mouth every 6 (six) hours as needed., Disp: , Rfl:     insulin glargine 100 units/mL (3mL) SubQ pen, Inject 17 Units into the skin Daily. And 10 units at night, Disp: , Rfl:     nitrofurantoin (MACRODANTIN) 100 MG capsule, Take 100 mg by mouth once daily., Disp: , Rfl:     NOVOLOG FLEXPEN U-100 INSULIN 100 unit/mL (3 mL) InPn pen, Inject into the skin 3 (three) times daily. Per sliding scale, Disp: , Rfl:     promethazine (PHENERGAN) 25 MG tablet, Take 25 mg by mouth every 6 (six) hours as needed., Disp: , Rfl:     traZODone (DESYREL) 100 MG tablet, Take 100-200 mg by mouth once daily., Disp: , Rfl:        ROS:    Constitutional: Denies fever, fatigue, generalized weakness, night sweats, or acute weight change  Skin: redness  and swelling to L arm AVF, no obvious drainage  HEENT: Denies acute change in hearing or vision, tinnitus, or dysphagia  Respiratory:  Denies cough, shortness of breath, or wheezing  Cardiovascular: Denies chest pain, palpitations, or swelling  Gastrointestional: Denies abdominal pain, nausea, vomiting, diarrhea, or constipation  Genitourinary: Denies dysuria, hematuria, or incontinence; reports able to empty bladder  Musculoskeletal: Denies myalgias, back pain, decreased ROM or focal weakness  Neurological: Denies headaches, seizures, dizziness, paresthesias or weakness  Hematological: Denies unusual bruising or bleeding  Psychiatric: Denies hallucinations, depression, or confusion      Vital Signs:  Vitals:    09/16/22 1045   BP: 94/60   Pulse: 100   Resp: 20   Temp: 97.9 °F (36.6 °C)     Body mass index is 18.83 kg/m².          Physical Exam:  General: no acute distress, awake, alert  Eyes: PERRLA, EOMI, conjunctiva clear, eyelids without swelling, dec peripheral vision  HENT: atraumatic, oropharynx and nasal mucosa patent  Neck: full ROM, no JVD, no thyromegaly or lymphadenopathy  Respiratory: equal, unlabored, clear to auscultation A/P  Cardiovascular: RRR without murmur or rub; radial and pedal pulses felt  Edema: none  Gastrointestinal: soft, non-tender, non-distended; positive bowel sounds; no masses to palpation  Genitourinary: suprapubic cath  Musculoskeletal:  uses cane for assistance in ambulation  Integumentary: warm, dry; no rashes, wounds, or skin lesions  Neurological: oriented, appropriate, no acute deficits  Dialysis access: left lower forearm; edema and erythema at  incisional site      Labs:  See media tab for full labs  9/15/22:  Cr 3.84  GFR 17  K 4.5  Ca 7.9  BUN 41  Na 133  H&H 10/32      Previous labs:  8/24/22:  Cr 2.7 (3.0 on 7/22/22)  BUN 22  K 4.5  Na 136  Co2 30  Ca 7.9  Phos 4.8   (261 on 7/22/22)  Proteinuria 10g/24 hours  H&H 11/34 (14/40 on 7/22/22)    Impression:    1. CKD  (chronic kidney disease) stage 4, GFR 15-29 ml/min        2. Hypertension, unspecified type        3. Proteinuria, unspecified type        4. DM (diabetes mellitus) with complications        5. Hyperkalemia          CKD4 related to diabetic nephropathy: significant increase from 2.7 to 3.8 in a few weeks, could be related to hypotension recently  Smoker   Significant advanced diabetic retinopathy & diabetic vasculopathy  Questionable demyelinating disease  Advanced diabetic neuropathy with difficulty in ambulation  Superficial skin infection AVF site,  on antibiotic- doxycycline, Pt will FU vascular in a few days  Relative Hypotension      Plan:    Has sooner FU with vascular on 9/22/22; DO NOT MISS APPOINTMENT    Decrease Gabapentin to 400 mg BID    Stop norvasc (AMLODIPINE)    Smoking cessation advised    FU in 4 weeks, labs 1 week before    Avoid NSAIDs (Aleve, Mobic, Celebrex, Ibuprofen, Advil, Toradol and Diclofenac).       BRITTNEY Whiting MD

## 2022-09-21 ENCOUNTER — TELEPHONE (OUTPATIENT)
Dept: NEPHROLOGY | Facility: CLINIC | Age: 42
End: 2022-09-21
Payer: MEDICARE

## 2022-09-21 NOTE — TELEPHONE ENCOUNTER
Patients mother Bessie called regarding patients blood pressure this morning 190/108, Instructed Per Duane Robison NP to give patient Clonidine 0.1 mg and monitor blood pressure. Verbalized understanding.

## 2022-09-22 ENCOUNTER — TELEPHONE (OUTPATIENT)
Dept: NEPHROLOGY | Facility: CLINIC | Age: 42
End: 2022-09-22
Payer: MEDICARE

## 2022-09-22 NOTE — TELEPHONE ENCOUNTER
Called mother Bessie to check on patients B/P stated that today it is 116/80. Instructed to continue monitoring and to report any problems. Verbalized understanding.

## 2022-10-04 ENCOUNTER — HOSPITAL ENCOUNTER (OUTPATIENT)
Facility: HOSPITAL | Age: 42
Discharge: HOME HEALTH | End: 2022-10-10
Attending: EMERGENCY MEDICINE
Payer: MEDICARE

## 2022-10-04 DIAGNOSIS — K92.0 HEMATEMESIS WITH NAUSEA: Primary | ICD-10-CM

## 2022-10-04 DIAGNOSIS — N18.9 ACUTE RENAL FAILURE SUPERIMPOSED ON CHRONIC KIDNEY DISEASE, UNSPECIFIED CKD STAGE, UNSPECIFIED ACUTE RENAL FAILURE TYPE: ICD-10-CM

## 2022-10-04 DIAGNOSIS — K22.6 MALLORY-WEISS TEAR: ICD-10-CM

## 2022-10-04 DIAGNOSIS — E10.65 HYPERGLYCEMIA DUE TO TYPE 1 DIABETES MELLITUS: ICD-10-CM

## 2022-10-04 DIAGNOSIS — N17.9 ACUTE RENAL FAILURE SUPERIMPOSED ON CHRONIC KIDNEY DISEASE, UNSPECIFIED CKD STAGE, UNSPECIFIED ACUTE RENAL FAILURE TYPE: ICD-10-CM

## 2022-10-04 LAB
ALBUMIN SERPL-MCNC: 3 GM/DL (ref 3.5–5)
ALBUMIN/GLOB SERPL: 1 RATIO (ref 1.1–2)
ALP SERPL-CCNC: 159 UNIT/L (ref 40–150)
ALT SERPL-CCNC: 58 UNIT/L (ref 0–55)
APPEARANCE UR: ABNORMAL
APTT PPP: 21.6 SECONDS (ref 23.2–33.7)
AST SERPL-CCNC: 29 UNIT/L (ref 5–34)
BACTERIA #/AREA URNS AUTO: ABNORMAL /HPF
BASOPHILS # BLD AUTO: 0.05 X10(3)/MCL (ref 0–0.2)
BASOPHILS NFR BLD AUTO: 0.7 %
BILIRUB UR QL STRIP.AUTO: NEGATIVE MG/DL
BILIRUBIN DIRECT+TOT PNL SERPL-MCNC: 0.2 MG/DL
BUN SERPL-MCNC: 43 MG/DL (ref 8.9–20.6)
CALCIUM SERPL-MCNC: 8.7 MG/DL (ref 8.4–10.2)
CHLORIDE SERPL-SCNC: 107 MMOL/L (ref 98–107)
CO2 SERPL-SCNC: 18 MMOL/L (ref 22–29)
COLOR UR AUTO: YELLOW
CREAT SERPL-MCNC: 3.28 MG/DL (ref 0.73–1.18)
EOSINOPHIL # BLD AUTO: 0.02 X10(3)/MCL (ref 0–0.9)
EOSINOPHIL NFR BLD AUTO: 0.3 %
ERYTHROCYTE [DISTWIDTH] IN BLOOD BY AUTOMATED COUNT: 13.6 % (ref 11.5–17)
GFR SERPLBLD CREATININE-BSD FMLA CKD-EPI: 23 MLS/MIN/1.73/M2
GLOBULIN SER-MCNC: 3 GM/DL (ref 2.4–3.5)
GLUCOSE SERPL-MCNC: 316 MG/DL (ref 74–100)
GLUCOSE UR QL STRIP.AUTO: ABNORMAL MG/DL
HCT VFR BLD AUTO: 44.9 % (ref 42–52)
HGB BLD-MCNC: 14.6 GM/DL (ref 14–18)
IMM GRANULOCYTES # BLD AUTO: 0.03 X10(3)/MCL (ref 0–0.04)
IMM GRANULOCYTES NFR BLD AUTO: 0.4 %
INR BLD: 0.97 (ref 0–1.3)
KETONES UR QL STRIP.AUTO: ABNORMAL MG/DL
LEUKOCYTE ESTERASE UR QL STRIP.AUTO: ABNORMAL UNIT/L
LIPASE SERPL-CCNC: 8 U/L
LYMPHOCYTES # BLD AUTO: 1.06 X10(3)/MCL (ref 0.6–4.6)
LYMPHOCYTES NFR BLD AUTO: 14.2 %
MAGNESIUM SERPL-MCNC: 2 MG/DL (ref 1.6–2.6)
MCH RBC QN AUTO: 31.2 PG (ref 27–31)
MCHC RBC AUTO-ENTMCNC: 32.5 MG/DL (ref 33–36)
MCV RBC AUTO: 95.9 FL (ref 80–94)
MONOCYTES # BLD AUTO: 0.31 X10(3)/MCL (ref 0.1–1.3)
MONOCYTES NFR BLD AUTO: 4.1 %
NEUTROPHILS # BLD AUTO: 6 X10(3)/MCL (ref 2.1–9.2)
NEUTROPHILS NFR BLD AUTO: 80.3 %
NITRITE UR QL STRIP.AUTO: POSITIVE
NRBC BLD AUTO-RTO: 0 %
PH UR STRIP.AUTO: 5.5 [PH]
PLATELET # BLD AUTO: 211 X10(3)/MCL (ref 130–400)
PMV BLD AUTO: 12.3 FL (ref 7.4–10.4)
POCT GLUCOSE: 269 MG/DL (ref 70–110)
POCT GLUCOSE: 290 MG/DL (ref 70–110)
POCT GLUCOSE: 328 MG/DL (ref 70–110)
POCT GLUCOSE: 390 MG/DL (ref 70–110)
POTASSIUM SERPL-SCNC: 4.6 MMOL/L (ref 3.5–5.1)
PROT SERPL-MCNC: 6 GM/DL (ref 6.4–8.3)
PROT UR QL STRIP.AUTO: ABNORMAL MG/DL
PROTHROMBIN TIME: 12.8 SECONDS (ref 12.5–14.5)
RBC # BLD AUTO: 4.68 X10(6)/MCL (ref 4.7–6.1)
RBC #/AREA URNS AUTO: ABNORMAL /HPF
RBC UR QL AUTO: ABNORMAL UNIT/L
SODIUM SERPL-SCNC: 139 MMOL/L (ref 136–145)
SP GR UR STRIP.AUTO: 1.02 (ref 1–1.03)
SQUAMOUS #/AREA URNS AUTO: <5 /HPF
UROBILINOGEN UR STRIP-ACNC: 0.2 MG/DL
WBC # SPEC AUTO: 7.5 X10(3)/MCL (ref 4.5–11.5)
WBC #/AREA URNS AUTO: ABNORMAL /HPF
YEAST URNS QL MICRO: ABNORMAL /HPF

## 2022-10-04 PROCEDURE — 63600175 PHARM REV CODE 636 W HCPCS: Performed by: EMERGENCY MEDICINE

## 2022-10-04 PROCEDURE — G0378 HOSPITAL OBSERVATION PER HR: HCPCS

## 2022-10-04 PROCEDURE — 81001 URINALYSIS AUTO W/SCOPE: CPT | Performed by: NURSE PRACTITIONER

## 2022-10-04 PROCEDURE — 96375 TX/PRO/DX INJ NEW DRUG ADDON: CPT

## 2022-10-04 PROCEDURE — 83735 ASSAY OF MAGNESIUM: CPT | Performed by: NURSE PRACTITIONER

## 2022-10-04 PROCEDURE — 96374 THER/PROPH/DIAG INJ IV PUSH: CPT

## 2022-10-04 PROCEDURE — C9113 INJ PANTOPRAZOLE SODIUM, VIA: HCPCS | Performed by: EMERGENCY MEDICINE

## 2022-10-04 PROCEDURE — 82962 GLUCOSE BLOOD TEST: CPT

## 2022-10-04 PROCEDURE — 85610 PROTHROMBIN TIME: CPT | Performed by: NURSE PRACTITIONER

## 2022-10-04 PROCEDURE — 99285 EMERGENCY DEPT VISIT HI MDM: CPT | Mod: 25

## 2022-10-04 PROCEDURE — 85025 COMPLETE CBC W/AUTO DIFF WBC: CPT | Performed by: NURSE PRACTITIONER

## 2022-10-04 PROCEDURE — 96361 HYDRATE IV INFUSION ADD-ON: CPT

## 2022-10-04 PROCEDURE — 80053 COMPREHEN METABOLIC PANEL: CPT | Performed by: NURSE PRACTITIONER

## 2022-10-04 PROCEDURE — 85730 THROMBOPLASTIN TIME PARTIAL: CPT | Performed by: NURSE PRACTITIONER

## 2022-10-04 PROCEDURE — 63600175 PHARM REV CODE 636 W HCPCS: Performed by: NURSE PRACTITIONER

## 2022-10-04 PROCEDURE — 87186 SC STD MICRODIL/AGAR DIL: CPT | Performed by: NURSE PRACTITIONER

## 2022-10-04 PROCEDURE — 63600175 PHARM REV CODE 636 W HCPCS: Performed by: INTERNAL MEDICINE

## 2022-10-04 PROCEDURE — 25000003 PHARM REV CODE 250: Performed by: NURSE PRACTITIONER

## 2022-10-04 PROCEDURE — 83690 ASSAY OF LIPASE: CPT | Performed by: NURSE PRACTITIONER

## 2022-10-04 PROCEDURE — 11000001 HC ACUTE MED/SURG PRIVATE ROOM

## 2022-10-04 PROCEDURE — 21400001 HC TELEMETRY ROOM

## 2022-10-04 PROCEDURE — 36415 COLL VENOUS BLD VENIPUNCTURE: CPT | Performed by: NURSE PRACTITIONER

## 2022-10-04 RX ORDER — INSULIN ASPART 100 [IU]/ML
1-10 INJECTION, SOLUTION INTRAVENOUS; SUBCUTANEOUS EVERY 6 HOURS PRN
Status: DISCONTINUED | OUTPATIENT
Start: 2022-10-05 | End: 2022-10-10 | Stop reason: HOSPADM

## 2022-10-04 RX ORDER — PANTOPRAZOLE SODIUM 40 MG/10ML
40 INJECTION, POWDER, LYOPHILIZED, FOR SOLUTION INTRAVENOUS 2 TIMES DAILY
Status: DISCONTINUED | OUTPATIENT
Start: 2022-10-04 | End: 2022-10-10 | Stop reason: HOSPADM

## 2022-10-04 RX ORDER — MORPHINE SULFATE 4 MG/ML
2 INJECTION, SOLUTION INTRAMUSCULAR; INTRAVENOUS EVERY 4 HOURS PRN
Status: DISCONTINUED | OUTPATIENT
Start: 2022-10-04 | End: 2022-10-05

## 2022-10-04 RX ORDER — GLUCAGON 1 MG
1 KIT INJECTION
Status: DISCONTINUED | OUTPATIENT
Start: 2022-10-05 | End: 2022-10-10 | Stop reason: HOSPADM

## 2022-10-04 RX ORDER — ONDANSETRON 2 MG/ML
4 INJECTION INTRAMUSCULAR; INTRAVENOUS EVERY 6 HOURS PRN
Status: DISCONTINUED | OUTPATIENT
Start: 2022-10-05 | End: 2022-10-10 | Stop reason: HOSPADM

## 2022-10-04 RX ORDER — PANTOPRAZOLE SODIUM 40 MG/10ML
80 INJECTION, POWDER, LYOPHILIZED, FOR SOLUTION INTRAVENOUS
Status: COMPLETED | OUTPATIENT
Start: 2022-10-04 | End: 2022-10-04

## 2022-10-04 RX ORDER — ONDANSETRON 2 MG/ML
4 INJECTION INTRAMUSCULAR; INTRAVENOUS
Status: COMPLETED | OUTPATIENT
Start: 2022-10-04 | End: 2022-10-04

## 2022-10-04 RX ADMIN — PANTOPRAZOLE SODIUM 80 MG: 40 INJECTION, POWDER, FOR SOLUTION INTRAVENOUS at 04:10

## 2022-10-04 RX ADMIN — ONDANSETRON 4 MG: 2 INJECTION INTRAMUSCULAR; INTRAVENOUS at 03:10

## 2022-10-04 RX ADMIN — SODIUM CHLORIDE 1000 ML: 9 INJECTION, SOLUTION INTRAVENOUS at 03:10

## 2022-10-04 RX ADMIN — ONDANSETRON 4 MG: 2 INJECTION INTRAMUSCULAR; INTRAVENOUS at 11:10

## 2022-10-04 RX ADMIN — INSULIN HUMAN 5 UNITS: 100 INJECTION, SOLUTION PARENTERAL at 05:10

## 2022-10-04 RX ADMIN — MORPHINE SULFATE 2 MG: 4 INJECTION INTRAVENOUS at 11:10

## 2022-10-04 RX ADMIN — INSULIN ASPART 4 UNITS: 100 INJECTION, SOLUTION INTRAVENOUS; SUBCUTANEOUS at 11:10

## 2022-10-04 RX ADMIN — PANTOPRAZOLE SODIUM 40 MG: 40 INJECTION, POWDER, LYOPHILIZED, FOR SOLUTION INTRAVENOUS at 08:10

## 2022-10-04 NOTE — ED PROVIDER NOTES
"Encounter Date: 10/4/2022    SCRIBE #1 NOTE: I, Masoud Velasquez, am scribing for, and in the presence of,  Dr. Balbuena. I have scribed the following portions of the note - Other sections scribed: HPI, ROS, Physical Exam, MDM, Attending.     History     Chief Complaint   Patient presents with    Hematuria     Pt reports vomiting x 3 day. Started throwing up blood this am. Generalized abd tenderness.  Denies diarrhea. Denies alcohol use, - blood thinners. Hx DM1, CKD, suprapubic cath in place. +chills. Denies chest pain, fever, sob.      41 y/o CM with history of HTN, type 1 DM, neuropathy, gastroparesis and CKD not on dialysis presents to ED for nausea and vomiting onset 3 days ago.  Pt's family member reports he was throwing up yellow and green emesis for the first two days, but he then had an episode of bright red hematemesis today around 1400 while at his doctor's office.  He reports he has never had this before.  Pt says he is unable to hold anything down.  He also reports dizziness when upright that he describes as "room spinning".  Pt recently had dialysis fistula placed.  His family member reports that his CBG was uncontrolled yesterday and in the 300s today.  Pt does not have history of liver disease, heavy EtOH use or frequent NSAID use.  He denies abdominal pain, diarrhea or fever.    The history is provided by the patient and a relative.   Emesis   This is a new problem. Illness onset: about 2.5 hours ago. The problem has been resolved. The emesis has an appearance of bright red blood. Pertinent negatives include no abdominal pain, no chills, no cough, no diarrhea, no fever and no headaches.   Review of patient's allergies indicates:   Allergen Reactions    Ivp dye [iodinated contrast media]     Metoclopramide      Other reaction(s): weakness    Ceftriaxone Rash     Other reaction(s): Other (See Comments)  Joint pain, chest tightness, numbness: hands, feet, throat, blurred vision, lightheaded   Other " reaction(s): Other (See Comments)  Joint pain, chest tightness, numbness: hands, feet, throat, blurred vision, lightheaded   Joint pain, chest tightness, numbness: hands, feet, throat, blurred vision, lightheaded   Other reaction(s): Other (See Comments)  Joint pain, chest tightness, numbness: hands, feet, throat, blurred vision, lightheaded        Past Medical History:   Diagnosis Date    Anemia in CKD (chronic kidney disease)     Back pain     Blind     DM (diabetes mellitus)     Gastroparesis     HTN (hypertension)     Neuropathy     Obesity     Suprapubic catheter     Urine retention      Past Surgical History:   Procedure Laterality Date    AV FISTULA PLACEMENT      CHOLECYSTECTOMY      DEBRIDEMENT      INSERTION OF SUPRAPUBIC CATHETER       Family History   Problem Relation Age of Onset    Arrhythmia Mother     Hypertension Mother     Arrhythmia Father      Social History     Tobacco Use    Smoking status: Every Day    Smokeless tobacco: Never   Substance Use Topics    Alcohol use: Never    Drug use: Never     Review of Systems   Constitutional:  Negative for chills, diaphoresis and fever.   HENT:  Negative for congestion and sore throat.    Eyes:  Negative for visual disturbance.   Respiratory:  Negative for cough and shortness of breath.    Cardiovascular:  Negative for chest pain and palpitations.   Gastrointestinal:  Positive for nausea and vomiting (hematemesis). Negative for abdominal pain and diarrhea.   Genitourinary:  Negative for dysuria and hematuria.   Skin:  Negative for rash.   Neurological:  Positive for dizziness. Negative for syncope, weakness, numbness and headaches.   All other systems reviewed and are negative.    Physical Exam     Initial Vitals   BP Pulse Resp Temp SpO2   10/04/22 1519 10/04/22 1519 10/04/22 1519 10/04/22 1519 10/04/22 1520   100/69 (!) 112 18 97.9 °F (36.6 °C) 100 %      MAP       --                Physical Exam    Nursing note and vitals reviewed.  Constitutional: He  appears well-developed. He is not diaphoretic. He does not appear ill. No distress.   Thin, frail   HENT:   Head: Normocephalic and atraumatic.   Right Ear: External ear normal.   Left Ear: External ear normal.   Nose: Nose normal.   Mouth/Throat: Oropharynx is clear and moist.   Eyes: EOM are normal. Pupils are equal, round, and reactive to light.   Pale conjunctiva   Neck: Neck supple. No tracheal deviation present.   Cardiovascular:  Regular rhythm, normal heart sounds and intact distal pulses.           No murmur heard.  AV fistula to L upper arm with palpable thrill; tachycardic   Pulmonary/Chest: Breath sounds normal. No respiratory distress. He has no wheezes. He has no rhonchi. He has no rales.   Abdominal: Abdomen is soft. Bowel sounds are normal. He exhibits no distension. There is no abdominal tenderness.   Suprapubic catheter   No right CVA tenderness.  No left CVA tenderness.   Musculoskeletal:         General: No edema. Normal range of motion.      Cervical back: Neck supple.     Neurological: He is alert and oriented to person, place, and time. He has normal strength. No cranial nerve deficit or sensory deficit. GCS score is 15. GCS eye subscore is 4. GCS verbal subscore is 5. GCS motor subscore is 6.   Skin: Skin is warm and dry. Capillary refill takes less than 2 seconds.   Psychiatric: He has a normal mood and affect. His mood appears not anxious.       ED Course   Procedures  Labs Reviewed   CBC WITH DIFFERENTIAL - Abnormal; Notable for the following components:       Result Value    RBC 4.68 (*)     MCV 95.9 (*)     MCH 31.2 (*)     MCHC 32.5 (*)     MPV 12.3 (*)     All other components within normal limits   COMPREHENSIVE METABOLIC PANEL - Abnormal; Notable for the following components:    Carbon Dioxide 18 (*)     Glucose Level 316 (*)     Blood Urea Nitrogen 43.0 (*)     Creatinine 3.28 (*)     Protein Total 6.0 (*)     Albumin Level 3.0 (*)     Albumin/Globulin Ratio 1.0 (*)     Alkaline  Phosphatase 159 (*)     Alanine Aminotransferase 58 (*)     All other components within normal limits      APTT - Abnormal; Notable for the following components:    PTT 21.6 (*)     All other components within normal limits    All other components within normal limits   POCT GLUCOSE - Abnormal; Notable for the following components:    POCT Glucose 269 (*)     All other components within normal limits   MAGNESIUM - Normal   LIPASE - Normal   PROTIME-INR - Normal   CULTURE, URINE   POCT GLUCOSE MONITORING CONTINUOUS          Imaging Results    None          Medications   pantoprazole injection 40 mg (has no administration in time range)   sodium chloride 0.9% bolus 1,000 mL (0 mLs Intravenous Stopped 10/4/22 1739)   ondansetron injection 4 mg (4 mg Intravenous Given 10/4/22 1559)   pantoprazole injection 80 mg (80 mg Intravenous Given 10/4/22 1645)   insulin regular injection 5 Units 0.05 mL (5 Units Intravenous Given 10/4/22 1726)     Medical Decision Making:   Initial Assessment:   43 yo male with nausea/vomiting x 2 days and now with hematemesis   Clinical Tests:   Lab Tests: Ordered and Reviewed  The following lab test(s) were unremarkable: CBC and Lipase       <> Summary of Lab: Hyperglycemia, AG 14, acute on chronic kidney disease   Radiological Study: Ordered and Reviewed  ED Management:  Given Zofran and IVF with improvement of nausea  Protonix IV   Insulin IV   GI consulted- clears and NPO midnight  Spoke with hospitalist for admission   Other:   I have discussed this case with another health care provider.        Scribe Attestation:   Scribe #1: I performed the above scribed service and the documentation accurately describes the services I performed. I attest to the accuracy of the note.    Attending Attestation:           Physician Attestation for Scribe:  Physician Attestation Statement for Scribe #1: I, reviewed documentation, as scribed by Masoud Velasquez in my presence, and it is both accurate and  complete.           ED Course as of 10/04/22 2030   Tue Oct 04, 2022   1714 BUN(!): 43.0 [KM]   1714 Creatinine(!): 3.28  Worsening renal function, will give IVF  [KM]   1714 Glucose(!): 316  Giving IVF and insulin, normal AG of 14  [KM]   1804 Spoke with Dr bunch with GI- clears and NPO at midnight  [KM]      ED Course User Index  [KM] Corrine Balbuena MD                 Clinical Impression:   Final diagnoses:  [K92.0] Hematemesis with nausea (Primary)  [E10.65] Hyperglycemia due to type 1 diabetes mellitus  [N17.9, N18.9] Acute renal failure superimposed on chronic kidney disease, unspecified CKD stage, unspecified acute renal failure type      ED Disposition Condition    Admit Stable                Corrine Balbuena MD  10/04/22 2033

## 2022-10-04 NOTE — FIRST PROVIDER EVALUATION
Medical screening examination initiated.  I have conducted a focused provider triage encounter, findings are as follows:  Chief Complaint   Patient presents with    Hematuria     Pt reports vomiting x 3 day. Started throwing up blood this am. Generalized abd tenderness.  Denies diarrhea. Denies alcohol use, - blood thinners. Hx DM1, CKD, suprapubic cath in place. +chills. Denies chest pain, fever, sob.          Brief history of present illness:  43 y/o male presents with vomiting for 3 days, some now has blood in it. +abdominal pain. Fluctuating bp and glucose at home. Type 1 DM, ckd, suprapubic catheter.     Vitals:    10/04/22 1519 10/04/22 1520   BP: 100/69    Pulse: (!) 112    Resp: 18    Temp: 97.9 °F (36.6 °C)    SpO2:  100%   Weight: 45.1 kg (99 lb 8 oz)        Pertinent physical exam:  alert, appears uncomfortable, vomiting in triage    Brief workup plan:  labs, urine, meds    Preliminary workup initiated; this workup will be continued and followed by the physician or advanced practice provider that is assigned to the patient when roomed.

## 2022-10-05 ENCOUNTER — ANESTHESIA EVENT (OUTPATIENT)
Dept: ENDOSCOPY | Facility: HOSPITAL | Age: 42
End: 2022-10-05
Payer: MEDICARE

## 2022-10-05 ENCOUNTER — ANESTHESIA (OUTPATIENT)
Dept: ENDOSCOPY | Facility: HOSPITAL | Age: 42
End: 2022-10-05
Payer: MEDICARE

## 2022-10-05 PROBLEM — K92.0 HEMATEMESIS WITH NAUSEA: Status: ACTIVE | Noted: 2022-10-05

## 2022-10-05 LAB
ALBUMIN SERPL-MCNC: 2.4 GM/DL (ref 3.5–5)
ALBUMIN/GLOB SERPL: 0.9 RATIO (ref 1.1–2)
ALP SERPL-CCNC: 123 UNIT/L (ref 40–150)
ALT SERPL-CCNC: 41 UNIT/L (ref 0–55)
AST SERPL-CCNC: 24 UNIT/L (ref 5–34)
BASOPHILS # BLD AUTO: 0.04 X10(3)/MCL (ref 0–0.2)
BASOPHILS NFR BLD AUTO: 0.7 %
BILIRUBIN DIRECT+TOT PNL SERPL-MCNC: 0.2 MG/DL
BUN SERPL-MCNC: 35.3 MG/DL (ref 8.9–20.6)
CALCIUM SERPL-MCNC: 8.3 MG/DL (ref 8.4–10.2)
CHLORIDE SERPL-SCNC: 113 MMOL/L (ref 98–107)
CO2 SERPL-SCNC: 22 MMOL/L (ref 22–29)
CREAT SERPL-MCNC: 2.57 MG/DL (ref 0.73–1.18)
EOSINOPHIL # BLD AUTO: 0.13 X10(3)/MCL (ref 0–0.9)
EOSINOPHIL NFR BLD AUTO: 2.3 %
ERYTHROCYTE [DISTWIDTH] IN BLOOD BY AUTOMATED COUNT: 13.6 % (ref 11.5–17)
GFR SERPLBLD CREATININE-BSD FMLA CKD-EPI: 31 MLS/MIN/1.73/M2
GLOBULIN SER-MCNC: 2.8 GM/DL (ref 2.4–3.5)
GLUCOSE SERPL-MCNC: 86 MG/DL (ref 74–100)
HCT VFR BLD AUTO: 32.5 % (ref 42–52)
HGB BLD-MCNC: 10.9 GM/DL (ref 14–18)
IMM GRANULOCYTES # BLD AUTO: 0.02 X10(3)/MCL (ref 0–0.04)
IMM GRANULOCYTES NFR BLD AUTO: 0.4 %
LYMPHOCYTES # BLD AUTO: 1.42 X10(3)/MCL (ref 0.6–4.6)
LYMPHOCYTES NFR BLD AUTO: 25.4 %
MCH RBC QN AUTO: 31 PG (ref 27–31)
MCHC RBC AUTO-ENTMCNC: 33.5 MG/DL (ref 33–36)
MCV RBC AUTO: 92.3 FL (ref 80–94)
MONOCYTES # BLD AUTO: 0.5 X10(3)/MCL (ref 0.1–1.3)
MONOCYTES NFR BLD AUTO: 8.9 %
NEUTROPHILS # BLD AUTO: 3.5 X10(3)/MCL (ref 2.1–9.2)
NEUTROPHILS NFR BLD AUTO: 62.3 %
NRBC BLD AUTO-RTO: 0 %
PLATELET # BLD AUTO: 225 X10(3)/MCL (ref 130–400)
PMV BLD AUTO: 11.2 FL (ref 7.4–10.4)
POCT GLUCOSE: 171 MG/DL (ref 70–110)
POCT GLUCOSE: 338 MG/DL (ref 70–110)
POCT GLUCOSE: 346 MG/DL (ref 70–110)
POCT GLUCOSE: 67 MG/DL (ref 70–110)
POCT GLUCOSE: 77 MG/DL (ref 70–110)
POTASSIUM SERPL-SCNC: 3.6 MMOL/L (ref 3.5–5.1)
PROT SERPL-MCNC: 5.2 GM/DL (ref 6.4–8.3)
RBC # BLD AUTO: 3.52 X10(6)/MCL (ref 4.7–6.1)
SODIUM SERPL-SCNC: 139 MMOL/L (ref 136–145)
WBC # SPEC AUTO: 5.6 X10(3)/MCL (ref 4.5–11.5)

## 2022-10-05 PROCEDURE — 63600175 PHARM REV CODE 636 W HCPCS: Performed by: INTERNAL MEDICINE

## 2022-10-05 PROCEDURE — 96372 THER/PROPH/DIAG INJ SC/IM: CPT | Mod: 59 | Performed by: INTERNAL MEDICINE

## 2022-10-05 PROCEDURE — 25000003 PHARM REV CODE 250: Performed by: INTERNAL MEDICINE

## 2022-10-05 PROCEDURE — 36415 COLL VENOUS BLD VENIPUNCTURE: CPT | Performed by: INTERNAL MEDICINE

## 2022-10-05 PROCEDURE — 85025 COMPLETE CBC W/AUTO DIFF WBC: CPT | Performed by: INTERNAL MEDICINE

## 2022-10-05 PROCEDURE — C9113 INJ PANTOPRAZOLE SODIUM, VIA: HCPCS | Performed by: EMERGENCY MEDICINE

## 2022-10-05 PROCEDURE — 25000003 PHARM REV CODE 250: Performed by: NURSE PRACTITIONER

## 2022-10-05 PROCEDURE — 96376 TX/PRO/DX INJ SAME DRUG ADON: CPT | Mod: 59

## 2022-10-05 PROCEDURE — 80053 COMPREHEN METABOLIC PANEL: CPT | Performed by: INTERNAL MEDICINE

## 2022-10-05 PROCEDURE — 63600175 PHARM REV CODE 636 W HCPCS: Performed by: NURSE ANESTHETIST, CERTIFIED REGISTERED

## 2022-10-05 PROCEDURE — 43235 EGD DIAGNOSTIC BRUSH WASH: CPT | Performed by: INTERNAL MEDICINE

## 2022-10-05 PROCEDURE — 25000003 PHARM REV CODE 250: Performed by: NURSE ANESTHETIST, CERTIFIED REGISTERED

## 2022-10-05 PROCEDURE — G0378 HOSPITAL OBSERVATION PER HR: HCPCS

## 2022-10-05 PROCEDURE — 63600175 PHARM REV CODE 636 W HCPCS: Performed by: EMERGENCY MEDICINE

## 2022-10-05 PROCEDURE — 37000008 HC ANESTHESIA 1ST 15 MINUTES: Performed by: INTERNAL MEDICINE

## 2022-10-05 PROCEDURE — 37000009 HC ANESTHESIA EA ADD 15 MINS: Performed by: INTERNAL MEDICINE

## 2022-10-05 RX ORDER — IPRATROPIUM BROMIDE AND ALBUTEROL SULFATE 2.5; .5 MG/3ML; MG/3ML
3 SOLUTION RESPIRATORY (INHALATION)
Status: CANCELLED | OUTPATIENT
Start: 2022-10-05

## 2022-10-05 RX ORDER — ERYTHROMYCIN ETHYLSUCCINATE 200 MG/5ML
200 SUSPENSION ORAL
Status: DISCONTINUED | OUTPATIENT
Start: 2022-10-05 | End: 2022-10-10 | Stop reason: HOSPADM

## 2022-10-05 RX ORDER — GLYCOPYRROLATE 0.2 MG/ML
INJECTION INTRAMUSCULAR; INTRAVENOUS
Status: COMPLETED
Start: 2022-10-05 | End: 2022-10-05

## 2022-10-05 RX ORDER — LIDOCAINE HYDROCHLORIDE 10 MG/ML
1 INJECTION, SOLUTION EPIDURAL; INFILTRATION; INTRACAUDAL; PERINEURAL ONCE
Status: CANCELLED | OUTPATIENT
Start: 2022-10-05 | End: 2022-10-05

## 2022-10-05 RX ORDER — ONDANSETRON 4 MG/1
8 TABLET, ORALLY DISINTEGRATING ORAL EVERY 6 HOURS PRN
Status: CANCELLED | OUTPATIENT
Start: 2022-10-05

## 2022-10-05 RX ORDER — LUBIPROSTONE 24 UG/1
24 CAPSULE ORAL
Status: DISCONTINUED | OUTPATIENT
Start: 2022-10-06 | End: 2022-10-10 | Stop reason: HOSPADM

## 2022-10-05 RX ORDER — INSULIN ASPART 100 [IU]/ML
1-10 INJECTION, SOLUTION INTRAVENOUS; SUBCUTANEOUS EVERY 6 HOURS PRN
Status: DISCONTINUED | OUTPATIENT
Start: 2022-10-05 | End: 2022-10-10 | Stop reason: HOSPADM

## 2022-10-05 RX ORDER — SODIUM CHLORIDE, SODIUM GLUCONATE, SODIUM ACETATE, POTASSIUM CHLORIDE AND MAGNESIUM CHLORIDE 30; 37; 368; 526; 502 MG/100ML; MG/100ML; MG/100ML; MG/100ML; MG/100ML
1000 INJECTION, SOLUTION INTRAVENOUS CONTINUOUS
Status: CANCELLED | OUTPATIENT
Start: 2022-10-05 | End: 2022-11-04

## 2022-10-05 RX ORDER — HALOPERIDOL 5 MG/ML
2 INJECTION INTRAMUSCULAR EVERY 4 HOURS PRN
Status: DISCONTINUED | OUTPATIENT
Start: 2022-10-05 | End: 2022-10-10 | Stop reason: HOSPADM

## 2022-10-05 RX ORDER — PROPOFOL 10 MG/ML
VIAL (ML) INTRAVENOUS
Status: DISCONTINUED | OUTPATIENT
Start: 2022-10-05 | End: 2022-10-05

## 2022-10-05 RX ORDER — LIDOCAINE HYDROCHLORIDE 20 MG/ML
INJECTION, SOLUTION EPIDURAL; INFILTRATION; INTRACAUDAL; PERINEURAL
Status: DISPENSED
Start: 2022-10-05 | End: 2022-10-05

## 2022-10-05 RX ORDER — ZOLPIDEM TARTRATE 5 MG/1
5 TABLET ORAL NIGHTLY
Status: DISCONTINUED | OUTPATIENT
Start: 2022-10-05 | End: 2022-10-10 | Stop reason: HOSPADM

## 2022-10-05 RX ORDER — TALC
6 POWDER (GRAM) TOPICAL NIGHTLY PRN
Status: DISCONTINUED | OUTPATIENT
Start: 2022-10-05 | End: 2022-10-10 | Stop reason: HOSPADM

## 2022-10-05 RX ORDER — MEPERIDINE HYDROCHLORIDE 25 MG/ML
12.5 INJECTION INTRAMUSCULAR; INTRAVENOUS; SUBCUTANEOUS EVERY 10 MIN PRN
Status: CANCELLED | OUTPATIENT
Start: 2022-10-05 | End: 2022-10-06

## 2022-10-05 RX ORDER — LABETALOL 200 MG/1
200 TABLET, FILM COATED ORAL EVERY 12 HOURS
Status: DISCONTINUED | OUTPATIENT
Start: 2022-10-05 | End: 2022-10-06

## 2022-10-05 RX ORDER — GLUCAGON 1 MG
1 KIT INJECTION
Status: DISCONTINUED | OUTPATIENT
Start: 2022-10-05 | End: 2022-10-10 | Stop reason: HOSPADM

## 2022-10-05 RX ORDER — GLYCOPYRROLATE 0.2 MG/ML
INJECTION INTRAMUSCULAR; INTRAVENOUS
Status: DISCONTINUED | OUTPATIENT
Start: 2022-10-05 | End: 2022-10-05

## 2022-10-05 RX ORDER — ONDANSETRON 2 MG/ML
4 INJECTION INTRAMUSCULAR; INTRAVENOUS DAILY PRN
Status: CANCELLED | OUTPATIENT
Start: 2022-10-05

## 2022-10-05 RX ORDER — OXYCODONE AND ACETAMINOPHEN 5; 325 MG/1; MG/1
1 TABLET ORAL EVERY 4 HOURS PRN
Status: DISCONTINUED | OUTPATIENT
Start: 2022-10-05 | End: 2022-10-10 | Stop reason: HOSPADM

## 2022-10-05 RX ORDER — OXYCODONE AND ACETAMINOPHEN 10; 325 MG/1; MG/1
1 TABLET ORAL EVERY 4 HOURS PRN
Status: DISCONTINUED | OUTPATIENT
Start: 2022-10-05 | End: 2022-10-10 | Stop reason: HOSPADM

## 2022-10-05 RX ORDER — SODIUM CHLORIDE 0.9 % (FLUSH) 0.9 %
10 SYRINGE (ML) INJECTION
Status: DISCONTINUED | OUTPATIENT
Start: 2022-10-05 | End: 2022-10-10 | Stop reason: HOSPADM

## 2022-10-05 RX ORDER — PROMETHAZINE HYDROCHLORIDE 25 MG/ML
25 INJECTION, SOLUTION INTRAMUSCULAR; INTRAVENOUS EVERY 6 HOURS PRN
Status: DISCONTINUED | OUTPATIENT
Start: 2022-10-05 | End: 2022-10-10 | Stop reason: HOSPADM

## 2022-10-05 RX ORDER — PROPOFOL 10 MG/ML
VIAL (ML) INTRAVENOUS
Status: COMPLETED
Start: 2022-10-05 | End: 2022-10-05

## 2022-10-05 RX ORDER — SODIUM CHLORIDE 9 MG/ML
INJECTION, SOLUTION INTRAVENOUS CONTINUOUS
Status: DISCONTINUED | OUTPATIENT
Start: 2022-10-05 | End: 2022-10-07

## 2022-10-05 RX ADMIN — ZOLPIDEM TARTRATE 5 MG: 5 TABLET ORAL at 08:10

## 2022-10-05 RX ADMIN — ONDANSETRON 4 MG: 2 INJECTION INTRAMUSCULAR; INTRAVENOUS at 02:10

## 2022-10-05 RX ADMIN — ERYTHROMYCIN ETHYLSUCCINATE 200 MG: 200 GRANULE, FOR SUSPENSION ORAL at 02:10

## 2022-10-05 RX ADMIN — DOCUSATE SODIUM 50 MG: 50 CAPSULE, LIQUID FILLED ORAL at 08:10

## 2022-10-05 RX ADMIN — INSULIN ASPART 8 UNITS: 100 INJECTION, SOLUTION INTRAVENOUS; SUBCUTANEOUS at 03:10

## 2022-10-05 RX ADMIN — SODIUM CHLORIDE: 9 INJECTION, SOLUTION INTRAVENOUS at 09:10

## 2022-10-05 RX ADMIN — DOCUSATE SODIUM 50 MG: 50 CAPSULE, LIQUID FILLED ORAL at 01:10

## 2022-10-05 RX ADMIN — PROMETHAZINE HYDROCHLORIDE 25 MG: 25 INJECTION INTRAMUSCULAR; INTRAVENOUS at 05:10

## 2022-10-05 RX ADMIN — PROPOFOL 50 MG: 10 INJECTION, EMULSION INTRAVENOUS at 11:10

## 2022-10-05 RX ADMIN — PANTOPRAZOLE SODIUM 40 MG: 40 INJECTION, POWDER, LYOPHILIZED, FOR SOLUTION INTRAVENOUS at 09:10

## 2022-10-05 RX ADMIN — GLYCOPYRROLATE 0.2 MG: 0.2 INJECTION INTRAMUSCULAR; INTRAVENOUS at 11:10

## 2022-10-05 RX ADMIN — INSULIN ASPART 8 UNITS: 100 INJECTION, SOLUTION INTRAVENOUS; SUBCUTANEOUS at 07:10

## 2022-10-05 RX ADMIN — SODIUM CHLORIDE: 9 INJECTION, SOLUTION INTRAVENOUS at 11:10

## 2022-10-05 RX ADMIN — ONDANSETRON 4 MG: 2 INJECTION INTRAMUSCULAR; INTRAVENOUS at 07:10

## 2022-10-05 RX ADMIN — ONDANSETRON 4 MG: 2 INJECTION INTRAMUSCULAR; INTRAVENOUS at 03:10

## 2022-10-05 RX ADMIN — GLUCAGON HYDROCHLORIDE 1 MG: KIT at 07:10

## 2022-10-05 RX ADMIN — PANTOPRAZOLE SODIUM 40 MG: 40 INJECTION, POWDER, LYOPHILIZED, FOR SOLUTION INTRAVENOUS at 08:10

## 2022-10-05 NOTE — CLINICAL REVIEW
42-year-old male admitted on 10/04 with hematemesis.  History of insulin-dependent DM, diabetic retinopathy, gastroparesis, CKD stage 4.  GI saw him and recommended EGD.  H&H has remained stable.  A KI on CKD has resolved.  H&H did drop, however, this was related to initial hemoconcentration.  His creatinine went from 3.28-2.57.  Nine months ago, his creatinine was 2.63.  As such, this represents his baseline.  The patient remains appropriate for observation level of care      Samy Kam MD  Utilization Management  Physician Advisor

## 2022-10-05 NOTE — PROVATION PATIENT INSTRUCTIONS
Discharge Summary/Instructions after an Endoscopic Procedure  Patient Name: Randall Hernandez  Patient MRN: [id number]  Patient YOB: 1980 Wednesday, October 5, 2022  Colby Meier MD  Dear patient,  As a result of recent federal legislation (The Federal Cures Act), you may   receive lab or pathology results from your procedure in your [facility]   account before your physician is able to contact you. Your physician or   their representative will relay the results to you with their   recommendations at their soonest availability.  Thank you,  RESTRICTIONS:  During your procedure today, you received medications for sedation.  These   medications may affect your judgment, balance and coordination.  Therefore,   for 24 hours, you have the following restrictions:   - DO NOT drive a car, operate machinery, make legal/financial decisions,   sign important papers or drink alcohol.    ACTIVITY:  Today: no heavy lifting, straining or running due to procedural   sedation/anesthesia.  The following day: return to full activity including work.  DIET:  Eat and drink normally unless instructed otherwise.     TREATMENT FOR COMMON SIDE EFFECTS:  - Mild abdominal pain, nausea, belching, bloating or excessive gas:  rest,   eat lightly and use a heating pad.  - Sore Throat: treat with throat lozenges and/or gargle with warm salt   water.  - Because air was used during the procedure, expelling large amounts of air   from your rectum or belching is normal.  - If a bowel prep was taken, you may not have a bowel movement for 1-3 days.    This is normal.  SYMPTOMS TO WATCH FOR AND REPORT TO YOUR PHYSICIAN:  1. Abdominal pain or bloating, other than gas cramps.  2. Chest pain.  3. Back pain.  4. Signs of infection such as: chills or fever occurring within 24 hours   after the procedure.  5. Rectal bleeding, which would show as bright red, maroon, or black stools.   (A tablespoon of blood from the rectum is not serious, especially  if   hemorrhoids are present.)  6. Vomiting.  7. Weakness or dizziness.  GO DIRECTLY TO THE NEAREST EMERGENCY ROOM IF YOU HAVE ANY OF THE FOLLOWING:      Difficulty breathing              Chills and/or fever over 101 F   Persistent vomiting and/or vomiting blood   Severe abdominal pain   Severe chest pain   Black, tarry stools   Bleeding- more than one tablespoon   Any other symptom or condition that you feel may need urgent attention  Your doctor recommends these additional instructions:  If any biopsies were taken, your doctors clinic will contact you in 1 to 2   weeks with any results.  - [facility] patient to [facility] for [facility] care.   - Trial of erythromycin 200 prior to meals  For questions, problems or results please call your physician - Colby Meier MD at Work:  [phone].  [facility], EMERGENCY ROOM PHONE NUMBER: [phone]  IF A COMPLICATION OR EMERGENCY SITUATION ARISES AND YOU ARE UNABLE TO REACH   YOUR PHYSICIAN - GO DIRECTLY TO THE EMERGENCY ROOM.  Colby Meier MD  10/5/2022 11:24:36 AM  This report has been verified and signed electronically.  Dear patient,  As a result of recent federal legislation (The Federal Cures Act), you may   receive lab or pathology results from your procedure in your [facility]   account before your physician is able to contact you. Your physician or   their representative will relay the results to you with their   recommendations at their soonest availability.  Thank you,  PROVATION

## 2022-10-05 NOTE — ANESTHESIA PREPROCEDURE EVALUATION
10/05/2022  Randall Hernandez is a 42 y.o., male admitted October 4th after 1 episode of hematemesis with nausea and vomiting.  He presents today for EGD Last dialysis on .  Patient tolerated general anesthesia with heavy phenylephrine support for brachiocephalic fistula at [facility] in August.  Patient has dialysis access but it has never been dialyzed.+ developmental delay which family member attributes to multiple strokes and diabetes    Last 3 sets of Vitals    Vitals - 1 value per visit 10/4/2022 10/5/2022 10/5/2022   SYSTOLIC - 134 173   DIASTOLIC - 81 93   Pulse - 83 102   Temp - 97.9 98.2   Resp 17 20 18   SPO2 - 98 98   Weight (lb) - - -   Weight (kg) - - -   Height - - -   BMI (Calculated) - - -   VISIT REPORT - - -   Pain Score  - - -       Recent Results (from the past 336 hour(s))   CBC with Differential    Collection Time: 10/05/22  6:31 AM   Result Value Ref Range    WBC 5.6 4.5 - 11.5 x10(3)/mcL    Hgb 10.9 (L) 14.0 - 18.0 gm/dL    Hct 32.5 (L) 42.0 - 52.0 %    Platelet 225 130 - 400 x10(3)/mcL   CBC with Differential    Collection Time: 10/04/22  3:41 PM   Result Value Ref Range    WBC 7.5 4.5 - 11.5 x10(3)/mcL    Hgb 14.6 14.0 - 18.0 gm/dL    Hct 44.9 42.0 - 52.0 %    Platelet 211 130 - 400 x10(3)/mcL       BMP  Lab Results   Component Value Date     10/05/2022    K 3.6 10/05/2022    CO2 22 10/05/2022    BUN 35.3 (H) 10/05/2022    CREATININE 2.57 (H) 10/05/2022    CALCIUM 8.3 (L) 10/05/2022    EGFRNONAA 29 12/13/2021        CMP  Sodium Level   Date Value Ref Range Status   10/05/2022 139 136 - 145 mmol/L Final     Potassium Level   Date Value Ref Range Status   10/05/2022 3.6 3.5 - 5.1 mmol/L Final     Carbon Dioxide   Date Value Ref Range Status   10/05/2022 22 22 - 29 mmol/L Final     Blood Urea Nitrogen   Date Value Ref Range Status   10/05/2022 35.3 (H) 8.9 - 20.6 mg/dL Final     Creatinine    Date Value Ref Range Status   10/05/2022 2.57 (H) 0.73 - 1.18 mg/dL Final     Calcium Level Total   Date Value Ref Range Status   10/05/2022 8.3 (L) 8.4 - 10.2 mg/dL Final     Albumin Level   Date Value Ref Range Status   10/05/2022 2.4 (L) 3.5 - 5.0 gm/dL Final     Bilirubin Total   Date Value Ref Range Status   10/05/2022 0.2 <=1.5 mg/dL Final     Alkaline Phosphatase   Date Value Ref Range Status   10/05/2022 123 40 - 150 unit/L Final     Aspartate Aminotransferase   Date Value Ref Range Status   10/05/2022 24 5 - 34 unit/L Final     Alanine Aminotransferase   Date Value Ref Range Status   10/05/2022 41 0 - 55 unit/L Final     Estimated GFR-Non    Date Value Ref Range Status   12/13/2021 29 mL/min/1.73 m2 Final      Lab Results   Component Value Date    LIPASE 8 10/04/2022        Lab Results   Component Value Date    HGBA1C 11.1 (H) 10/14/2021       Lab Results   Component Value Date    INR 0.97 10/04/2022    PROTIME 12.8 10/04/2022     Pre-op Assessment    I have reviewed the Patient Summary Reports.     I have reviewed the Nursing Notes. I have reviewed the NPO Status.   I have reviewed the Medications.     Review of Systems  Anesthesia Hx:   Denies Personal Hx of Anesthesia complications.   Social:  Smoker    Cardiovascular:   Hypertension  Functional Capacity good / => 4 METS    Renal/:   Chronic Renal Disease, CKD    Hepatic/GI:   GERD    Neurological:   TIA, CVA    Endocrine:  Diabetes, Type 1 Diabetes , Complications include Diabetic Gastropathy        Physical Exam  General: Well nourished, Cooperative, Alert and Oriented  blind  Airway:  Mallampati: II   Mouth Opening: Normal  TM Distance: Normal  Tongue: Normal  Neck ROM: Normal ROM    Dental:  Edentulous    Chest/Lungs:  Clear to auscultation, Normal Respiratory Rate    Heart:  Rate: Normal  Rhythm: Regular Rhythm        Anesthesia Plan  Type of Anesthesia, risks & benefits discussed:    Anesthesia Type: Gen Natural Airway  Intra-op  Monitoring Plan: Standard ASA Monitors  Induction:  IV  Informed Consent: Informed consent signed with the Patient representative and all parties understand the risks and agree with anesthesia plan.  All questions answered.   ASA Score: 3  Day of Surgery Review of History & Physical: H&P Update referred to the surgeon/provider.    Ready For Surgery From Anesthesia Perspective.     .

## 2022-10-05 NOTE — H&P
[facility] Hospital Medicine History & Physical Examination       Patient Name: Randall Hernandez  MRN: [id number]  Patient Class: IP- Inpatient   Admission Date: 10/4/2022  3:23 PM  Length of Stay: 1  Admitting Service: Hospital Medicine   Attending Physician: Kemar Lamb MD   Primary Care Provider: Juvenal Otto MD  History source: EMR, patient and/or patient's family    CHIEF COMPLAINT   Vomiting blood    HISTORY OF PRESENT ILLNESS:   Patient is a 42-year-old male with high history of.  Insulin-dependent diabetes mellitus,  diabetic retinopathy, gastroparesis and chronic kidney disease stage 4 who presented to the ER with 3 days of nausea and vomiting and 1 episode of bloody emesis.  He denies any prior history of GI bleeding.  He denied excessive NSAID use.  Since admission he has had no further episodes of hematemesis.  On arrival to the ER he was slightly tachycardic but hemodynamically stable and afebrile.  Laboratory work showed a hemoglobin of 14, mild acute on chronic kidney disease.  GI was consulted recommended NPO after midnight I will see in consultation.    PAST MEDICAL HISTORY:     Past Medical History:   Diagnosis Date    Anemia in CKD (chronic kidney disease)     Back pain     Blind     DM (diabetes mellitus)     Gastroparesis     HTN (hypertension)     Neuropathy     Obesity     Suprapubic catheter     Urine retention        PAST SURGICAL HISTORY:     Past Surgical History:   Procedure Laterality Date    AV FISTULA PLACEMENT      CHOLECYSTECTOMY      DEBRIDEMENT      INSERTION OF SUPRAPUBIC CATHETER         ALLERGIES:   Ivp dye [iodinated contrast media], Metoclopramide, and Ceftriaxone    FAMILY HISTORY:   Reviewed and non-contributory     SOCIAL HISTORY:     Social History     Tobacco Use    Smoking status: Every Day    Smokeless tobacco: Never   Substance Use Topics    Alcohol use: Never        HOME MEDICATIONS:     Prior to Admission medications     Medication Sig Start Date End Date Taking? Authorizing Provider   ascorbic acid, vitamin C, (VITAMIN C) 1000 MG tablet Take 1,000 mg by mouth once daily.    Historical Provider   carvediloL (COREG) 6.25 MG tablet Take 1 tablet (6.25 mg total) by mouth 2 (two) times daily with meals.  Patient taking differently: Take 3.125 mg by mouth as needed. 9/1/22 9/1/23  Patric Watkins MD   diphenoxylate-atropine 2.5-0.025 mg (LOMOTIL) 2.5-0.025 mg per tablet as needed. 1/17/22   Historical Provider   doxycycline (VIBRAMYCIN) 100 MG Cap Take 100 mg by mouth 2 (two) times daily. 9/14/22   Historical Provider   famotidine (PEPCID) 20 MG tablet Take 20 mg by mouth 2 (two) times daily. 5/31/22   Historical Provider   gabapentin (NEURONTIN) 400 MG capsule Take 400 mg by mouth 2 (two) times daily. 6/2/22   Historical Provider   HYDROcodone-acetaminophen (NORCO)  mg per tablet Take 1 tablet by mouth every 6 (six) hours as needed. 6/16/22   Historical Provider   insulin glargine 100 units/mL (3mL) SubQ pen Inject 17 Units into the skin Daily. And 10 units at night    Historical Provider   nitrofurantoin (MACRODANTIN) 100 MG capsule Take 100 mg by mouth once daily. 6/16/22   Historical Provider   NOVOLOG FLEXPEN U-100 INSULIN 100 unit/mL (3 mL) InPn pen Inject into the skin 3 (three) times daily. Per sliding scale 4/26/22   Historical Provider   promethazine (PHENERGAN) 25 MG tablet Take 25 mg by mouth every 6 (six) hours as needed. 6/16/22   Historical Provider   traZODone (DESYREL) 100 MG tablet Take 100-200 mg by mouth once daily. 6/16/22   Historical Provider       REVIEW OF SYSTEMS:   Except as documented, all other systems reviewed and negative     PHYSICAL EXAM:   T 98.1 °F (36.7 °C)   BP (!) 148/80   P 87   RR 17   O2 98 %  GENERAL: awake, alert, oriented and in no acute distress, non-toxic appearing   HEENT: normocephalic atraumatic   NECK: supple   LUNGS: Clear bilaterally, no wheezing or rales, no accessory muscle use    CVS: Regular rate and rhythm, normal peripheral perfusion  ABD: Soft, non-tender, non-distended, bowel sounds present  EXTREMITIES: no clubbing or cyanosis  SKIN: Warm, dry.   NEURO: alert and oriented, grossly without focal deficits   PSYCHIATRIC: Cooperative    LABS AND IMAGING:     Recent Labs     10/04/22  1541   WBC 7.5   RBC 4.68*   HGB 14.6   HCT 44.9   MCV 95.9*   MCH 31.2*   MCHC 32.5*   RDW 13.6        No results for input(s): LACTIC in the last 72 hours.  Recent Labs     10/04/22  1541   INR 0.97     No results for input(s): HGBA1C, CHOL, TRIG, LDL, VLDL, HDL in the last 72 hours.   Recent Labs     10/04/22  1541      K 4.6   CHLORIDE 107   CO2 18*   BUN 43.0*   CREATININE 3.28*   GLUCOSE 316*   CALCIUM 8.7   MG 2.00   ALBUMIN 3.0*   GLOBULIN 3.0   ALKPHOS 159*   ALT 58*   AST 29   BILITOT 0.2   LIPASE 8     No results for input(s): BNP, CPK, TROPONINI in the last 72 hours.         ASSESSMENT & PLAN:   Hematemesis x1   Nausea and vomiting likely viral gastroenteritis   History of CKD 4, DM2, HTN, gastroparesis     -IV Protonix   -NPO   -repeat H&H in a.m.   -GI following   -antiemetics as needed    DVT prophylaxis: SCDs  Code status: full    If patient was admitted under observational status it is with my approval/permission.     At least 55 min was spent on this history and physical.  Time seen: 11PM   Kemar Lamb MD

## 2022-10-05 NOTE — PROGRESS NOTES
[facility] Hospital Medicine Progress Note        Chief Complaint: Inpatient follow-up on hematemesis    HPI:   Patient is a 42-year-old white male with a past medical history of Insulin-dependent diabetes mellitus, diabetic retinopathy, diabetic gastroparesis and chronic kidney disease stage 4 who presented to the ER with 3 days of nausea and vomiting and 1 episode of bloody emesis.  He denies any prior history of GI bleeding.  He denied excessive NSAID use.  Since admission he has had no further episodes of hematemesis.  On arrival to the ER he was slightly tachycardic but hemodynamically stable and afebrile.  Laboratory work showed a hemoglobin of 14, mild acute on chronic kidney disease.  GI was consulted and recommended nothing by mouth.  Patient was very promptly admitted to the hospital medicine service.    Interval Hx:   No major changes since admission last night.  Patient is awaiting an EGD.  His afebrile, on room air, and hemodynamically stable.    Objective/physical exam:  General:  Chronically ill-appearing white male who appears older than his stated age but is in no acute distress  HENT: normocephalic, atraumatic  Eye: PERRL, EOMI, clear conjunctiva  Neck: full ROM, no thyromegaly  Respiratory: clear to auscultation bilaterally  Cardiovascular: regular rate and rhythm  Gastrointestinal: non-distended, positive bowel sounds, non-tender  Musculoskeletal: no gross deformity  Genitourinary: Suprapubic catheter  Integumentary: warm, dry, intact, no rashes  Neurological: cranial nerves grossly intact, no focal neurological deficit  Psychiatric: cooperative, flat affect      VITAL SIGNS: 24 HRS MIN & MAX LAST   Temp  Min: 96.8 °F (36 °C)  Max: 98.6 °F (37 °C) 98.1 °F (36.7 °C)   BP  Min: 100/69  Max: 190/104 (!) 150/83     Pulse  Min: 83  Max: 112  89   Resp  Min: 17  Max: 21 18   SpO2  Min: 84 %  Max: 100 % 99 %       Recent Labs   Lab 10/04/22  1541 10/05/22  0631   WBC 7.5  5.6   RBC 4.68* 3.52*   HGB 14.6 10.9*   HCT 44.9 32.5*   MCV 95.9* 92.3   MCH 31.2* 31.0   MCHC 32.5* 33.5   RDW 13.6 13.6    225   MPV 12.3* 11.2*       Recent Labs   Lab 10/04/22  1541 10/05/22  0515    139   K 4.6 3.6   CO2 18* 22   BUN 43.0* 35.3*   CREATININE 3.28* 2.57*   CALCIUM 8.7 8.3*   MG 2.00  --    ALBUMIN 3.0* 2.4*   ALKPHOS 159* 123   ALT 58* 41   AST 29 24   BILITOT 0.2 0.2          Microbiology Results (last 7 days)       Procedure Component Value Units Date/Time    Urine culture [[id number]] Collected: 10/04/22 1550    Order Status: Resulted Specimen: Urine, Supra Pubic Updated: 10/04/22 5424             See below for Radiology    Scheduled Med:   docusate sodium  50 mg Oral BID    erythromycin ethylsuccinate  200 mg Oral TID WM    LIDOcaine (PF) 20 mg/ml (2%)        [START ON 10/6/2022] lubiprostone  24 mcg Oral Daily with breakfast    pantoprazole  40 mg Intravenous BID        Continuous Infusions:   sodium chloride 0.9% 75 mL/hr at 10/05/22 0919        PRN Meds:  dextrose 10%, dextrose 10%, dextrose 50%, glucagon (human recombinant), glucagon (human recombinant), haloperidol lactate, insulin aspart U-100, insulin aspart U-100, melatonin, ondansetron, promethazine, sodium chloride 0.9%       Assessment/Plan:  Hematemesis likely due to More-Walden tear   Chronic constipation  Insulin-dependent diabetes mellitus  Diabetic gastroparesis  Diabetic retinopathy with blindness  Diabetic neuropathy  Stage IV chronic kidney disease  Anemia of chronic disease, the drop in hemoglobin is more likely due to dilution due to rehydration rather than actual blood loss  Essential hypertension   Neurogenic bladder with suprapubic catheter      Plan:  Patient is awaiting an EGD  Continue hydration and prokinetic agents and other supportive care     VTE prophylaxis:  SCDs    Patient condition:  Stable    Anticipated discharge and Disposition:     Home    All diagnosis and differential diagnosis have  been reviewed; assessment and plan has been documented; I have personally reviewed the labs and test results that are presently available; I have reviewed the patients medication list; I have reviewed the consulting providers response and recommendations. I have reviewed or attempted to review medical records based upon their availability    All of the patient's questions have been  addressed and answered. Patient's is agreeable to the above stated plan. I will continue to monitor closely and make adjustments to medical management as needed.  _____________________________________________________________________    Nutrition Status:    Radiology:  US Retroperitoneal Complete  Retroperitoneal ultrasound.     HISTORY: Chronic renal disease.     FINDINGS: Grayscale imaging and color flow Doppler images are  available for interpretation.     Examination reveals right kidney to measure 8.9 x 4.9 x 5.4 cm is of  normal size shape and contour with no abnormal masses or  hydronephrosis.     Left kidney measures 9.7 x 6.3 x 5 cm it is of normal size shape and  contour with no abnormal masses or hydronephrosis.     Bladder was decompressed by a Crandall catheter.     IMPRESSION: No significant abnormalities identified      Electronically Signed By: Piotr Pratt MD  Date/Time Signed: 09/02/2021 11:52      Itz Kevin MD   10/05/2022

## 2022-10-05 NOTE — ANESTHESIA POSTPROCEDURE EVALUATION
Anesthesia Post Evaluation    Patient: Randall Hernandez    Procedure(s) Performed: Procedure(s) (LRB):  EGD (N/A)    Final Anesthesia Type: general      Patient location during evaluation: GI PACU  Patient participation: Yes- Able to Participate  Level of consciousness: awake and alert  Post-procedure vital signs: reviewed and stable  Pain management: adequate  Airway patency: patent    PONV status at discharge: No PONV  Anesthetic complications: no      Cardiovascular status: blood pressure returned to baseline  Respiratory status: spontaneous ventilation and room air  Hydration status: euvolemic  Follow-up not needed.          Vitals Value Taken Time   /83 10/05/22 1210   Temp 36.7 °C (98.1 °F) 10/05/22 1115   Pulse 89 10/05/22 1210   Resp 18 10/05/22 1210   SpO2 99 % 10/05/22 1210         No case tracking events are documented in the log.      Pain/Claude Score: Pain Rating Prior to Med Admin: 7 (10/4/2022 11:19 PM)  Pain Rating Post Med Admin: 3 (10/4/2022 11:49 PM)  Claude Score: 10 (10/5/2022 11:55 AM)

## 2022-10-05 NOTE — TRANSFER OF CARE
"Anesthesia Transfer of Care Note    Patient: Randall Hernandez    Procedure(s) Performed: Procedure(s) (LRB):  EGD (N/A)    Patient location: GI    Anesthesia Type: general    Transport from OR: Transported from OR on room air with adequate spontaneous ventilation    Post pain: adequate analgesia    Post assessment: no apparent anesthetic complications    Post vital signs: stable    Level of consciousness: awake and confused    Nausea/Vomiting: no nausea/vomiting    Complications: none    Transfer of care protocol was followed      Last vitals:   Visit Vitals  BP (!) 149/89   Pulse 92   Temp 36 °C (96.8 °F)   Resp (!) 21   Ht 5' 1" (1.549 m)   Wt 46.3 kg (102 lb)   SpO2 99%   BMI 19.27 kg/m²     "

## 2022-10-05 NOTE — CONSULTS
"Gastroenterology Consultation Note    Reason for Consult:  hematemesis    PCP:   Juvenal Otto MD    Referring MD:  Itz Kevin MD    Hospital Day: 1     Initial History of Present Illness (HPI):  This is a 42 y.o. male with previous medical history of Insulin-dependent diabetes mellitus,  diabetic retinopathy, gastroparesis and chronic kidney disease stage 4 who presented to the ER with 3 days of nausea and vomiting [yellow and green emesis for the first 2 days] and 1 episode of bloody emesis [the third day].  He denies any prior history of GI bleeding.  He denied excessive NSAID use. He also reports dizziness when upright that he describes as "room spinning". Since admission he has had no further episodes of hematemesis.  On arrival to the ER he was slightly tachycardic but hemodynamically stable and afebrile.  Laboratory work showed a hemoglobin of 14, mild acute on chronic kidney disease.     Labs 10/05/22:  WBC 5.6  H&H 10.9/32.5  Plt 225  BUN 35.3  Creat 2.57  LFTs wnl  Urine: 4+ protein, 3+ glucose, 1+ ketones, 1+ occult blood, positive nitrite, 1+ leuk, 11-20 WBC, 1+ bacteria, many yeast    Patient is NPO and was placed on PPI BID IV during admission.  Patient lying in bed, family member at bedside. According to patient and family, patient experiences frequent vomiting d/t gastroparesis. Prior to admission, he had been vomiting for 3 days, with the first two days having yellow/green emesis. On the third day he experienced bright red blood when he vomited. Since admission patient vomited overnight but it is unknown if there was blood in this emesis.  Patient denies NSAID use, ETOH use, and blood thinners.  When asked if patient ever had a NM gastric emptying study as ordered by Dr. Marley post EGD in April 2021 [see below], patient states he may have had this done at [facility] but does not know the results.   Last BM 2 days ago. Patient does not know if stool contained blood or black color. "   Patient experiences chronic constipation but does not take anything regularly for constipation relief. Family member states that when he gets very constipated, she will give him an OTC laxative which helps.  Upon chart review, patient is allergic to metoclopramide.  Blood glucose is variable, most recent glucose 171. Patient does have continuous glucose monitor on right side of abd.    Imaging:  CT abd/pel 08/07/22: no evidence of acute abd or pel disease    SBFT 07/31/15: Increased small bowel transit time, but this is at least partially secondary to delayed emptying of the stomach. If clinically warranted, gastric emptying study could be considered for better gastric functional quantification.    Endoscopy:    EGD 04/23/21 with Dr. Marley: grade B esophagitis in the GE junction, mild erythema in the antrum compatible with gastritis, normal mucosa in the whole examined duodenum. Patient was given pantoprazole 40mg PO BID. Ordered NM gastric emptying study.    Colonoscopy in 2016 in [place] per chart review    ROS:  Review of Systems   Constitutional:  Positive for malaise/fatigue.   Respiratory:  Negative for cough and shortness of breath.    Cardiovascular:  Negative for chest pain.   Gastrointestinal:  Positive for constipation, nausea and vomiting. Negative for abdominal pain.   Neurological:  Positive for weakness.     Medical History:   Past Medical History:   Diagnosis Date    Anemia in CKD (chronic kidney disease)     Back pain     Blind     DM (diabetes mellitus)     Gastroparesis     HTN (hypertension)     Neuropathy     Obesity     Suprapubic catheter     Urine retention        Surgical History:   Past Surgical History:   Procedure Laterality Date    AV FISTULA PLACEMENT      CHOLECYSTECTOMY      DEBRIDEMENT      INSERTION OF SUPRAPUBIC CATHETER         Family History:   Family History   Problem Relation Age of Onset    Arrhythmia Mother     Hypertension Mother     Arrhythmia Father     .     Social History:   Social History     Tobacco Use    Smoking status: Every Day    Smokeless tobacco: Never   Substance Use Topics    Alcohol use: Never       Allergies:  Review of patient's allergies indicates:   Allergen Reactions    Ivp dye [iodinated contrast media]     Metoclopramide      Other reaction(s): weakness    Ceftriaxone Rash     Other reaction(s): Other (See Comments)  Joint pain, chest tightness, numbness: hands, feet, throat, blurred vision, lightheaded   Other reaction(s): Other (See Comments)  Joint pain, chest tightness, numbness: hands, feet, throat, blurred vision, lightheaded   Joint pain, chest tightness, numbness: hands, feet, throat, blurred vision, lightheaded   Other reaction(s): Other (See Comments)  Joint pain, chest tightness, numbness: hands, feet, throat, blurred vision, lightheaded          Medications Prior to Admission   Medication Sig Dispense Refill Last Dose    ascorbic acid, vitamin C, (VITAMIN C) 1000 MG tablet Take 1,000 mg by mouth once daily.       carvediloL (COREG) 6.25 MG tablet Take 1 tablet (6.25 mg total) by mouth 2 (two) times daily with meals. (Patient taking differently: Take 3.125 mg by mouth as needed.) 60 tablet 11     diphenoxylate-atropine 2.5-0.025 mg (LOMOTIL) 2.5-0.025 mg per tablet as needed.       doxycycline (VIBRAMYCIN) 100 MG Cap Take 100 mg by mouth 2 (two) times daily.       famotidine (PEPCID) 20 MG tablet Take 20 mg by mouth 2 (two) times daily.       gabapentin (NEURONTIN) 400 MG capsule Take 400 mg by mouth 2 (two) times daily.       HYDROcodone-acetaminophen (NORCO)  mg per tablet Take 1 tablet by mouth every 6 (six) hours as needed.       insulin glargine 100 units/mL (3mL) SubQ pen Inject 17 Units into the skin Daily. And 10 units at night       nitrofurantoin (MACRODANTIN) 100 MG capsule Take 100 mg by mouth once daily.       NOVOLOG FLEXPEN U-100 INSULIN 100 unit/mL (3 mL) InPn pen Inject into the skin 3 (three)  times daily. Per sliding scale       promethazine (PHENERGAN) 25 MG tablet Take 25 mg by mouth every 6 (six) hours as needed.       traZODone (DESYREL) 100 MG tablet Take 100-200 mg by mouth once daily.            Objective Findings:    Vital Signs:  BP (!) 173/93   Pulse 102   Temp 98.2 °F (36.8 °C) (Oral)   Resp 18   Wt 45.1 kg (99 lb 8 oz)   SpO2 98%   BMI 18.80 kg/m²   Body mass index is 18.8 kg/m².    Physical Exam:  Physical Exam  Constitutional:       General: He is not in acute distress.     Appearance: He is underweight.   HENT:      Head: Normocephalic and atraumatic.      Right Ear: External ear normal.      Left Ear: External ear normal.      Nose: Nose normal.      Mouth/Throat:      Pharynx: Oropharynx is clear.   Eyes:      Conjunctiva/sclera: Conjunctivae normal.   Cardiovascular:      Rate and Rhythm: Normal rate and regular rhythm.      Heart sounds: Normal heart sounds. No murmur heard.  Pulmonary:      Effort: Pulmonary effort is normal. No respiratory distress.      Breath sounds: Normal breath sounds.   Abdominal:      General: Abdomen is flat. Bowel sounds are decreased. There is no distension.      Tenderness: There is no abdominal tenderness. There is no guarding.      Comments: Dexcom glucose monitor on right side of abd.  Suprapubic catheter in place.   Musculoskeletal:      Cervical back: Normal range of motion and neck supple.   Skin:     General: Skin is warm and dry.      Coloration: Skin is pale.   Neurological:      Mental Status: He is alert and oriented to person, place, and time. Mental status is at baseline.   Psychiatric:         Mood and Affect: Mood normal.         Behavior: Behavior normal.         Thought Content: Thought content normal.       Labs:  Recent Results (from the past 24 hour(s))   POCT glucose    Collection Time: 10/04/22  3:23 PM   Result Value Ref Range    POCT Glucose 290 (H) 70 - 110 mg/dL   CBC with Differential    Collection Time: 10/04/22  3:41 PM    Result Value Ref Range    WBC 7.5 4.5 - 11.5 x10(3)/mcL    RBC 4.68 (L) 4.70 - 6.10 x10(6)/mcL    Hgb 14.6 14.0 - 18.0 gm/dL    Hct 44.9 42.0 - 52.0 %    MCV 95.9 (H) 80.0 - 94.0 fL    MCH 31.2 (H) 27.0 - 31.0 pg    MCHC 32.5 (L) 33.0 - 36.0 mg/dL    RDW 13.6 11.5 - 17.0 %    Platelet 211 130 - 400 x10(3)/mcL    MPV 12.3 (H) 7.4 - 10.4 fL    Neut % 80.3 %    Lymph % 14.2 %    Mono % 4.1 %    Eos % 0.3 %    Basophil % 0.7 %    Lymph # 1.06 0.6 - 4.6 x10(3)/mcL    Neut # 6.0 2.1 - 9.2 x10(3)/mcL    Mono # 0.31 0.1 - 1.3 x10(3)/mcL    Eos # 0.02 0 - 0.9 x10(3)/mcL    Baso # 0.05 0 - 0.2 x10(3)/mcL    IG# 0.03 0 - 0.04 x10(3)/mcL    IG% 0.4 %    NRBC% 0.0 %   Comprehensive Metabolic Panel    Collection Time: 10/04/22  3:41 PM   Result Value Ref Range    Sodium Level 139 136 - 145 mmol/L    Potassium Level 4.6 3.5 - 5.1 mmol/L    Chloride 107 98 - 107 mmol/L    Carbon Dioxide 18 (L) 22 - 29 mmol/L    Glucose Level 316 (H) 74 - 100 mg/dL    Blood Urea Nitrogen 43.0 (H) 8.9 - 20.6 mg/dL    Creatinine 3.28 (H) 0.73 - 1.18 mg/dL    Calcium Level Total 8.7 8.4 - 10.2 mg/dL    Protein Total 6.0 (L) 6.4 - 8.3 gm/dL    Albumin Level 3.0 (L) 3.5 - 5.0 gm/dL    Globulin 3.0 2.4 - 3.5 gm/dL    Albumin/Globulin Ratio 1.0 (L) 1.1 - 2.0 ratio    Bilirubin Total 0.2 <=1.5 mg/dL    Alkaline Phosphatase 159 (H) 40 - 150 unit/L    Alanine Aminotransferase 58 (H) 0 - 55 unit/L    Aspartate Aminotransferase 29 5 - 34 unit/L    eGFR 23 mls/min/1.73/m2   Magnesium    Collection Time: 10/04/22  3:41 PM   Result Value Ref Range    Magnesium Level 2.00 1.60 - 2.60 mg/dL   Lipase    Collection Time: 10/04/22  3:41 PM   Result Value Ref Range    Lipase Level 8 <=60 U/L   Protime-INR    Collection Time: 10/04/22  3:41 PM   Result Value Ref Range    PT 12.8 12.5 - 14.5 seconds    INR 0.97 0.00 - 1.30   APTT    Collection Time: 10/04/22  3:41 PM   Result Value Ref Range    PTT 21.6 (L) 23.2 - 33.7 seconds   Urinalysis, Reflex to Urine Culture  Urine, Clean Catch    Collection Time: 10/04/22  3:50 PM    Specimen: Urine, Supra Pubic   Result Value Ref Range    Color, UA Yellow Yellow, Colorless, Other, Clear    Appearance, UA Cloudy (A) Clear    Specific Gravity, UA 1.021 1.001 - 1.030    pH, UA 5.5 5.0, 5.5, 6.0, 6.5, 7.0, 7.5, 8.0, 8.5    Protein, UA 4+ (A) Negative mg/dL    Glucose, UA 3+ (A) Negative, Normal mg/dL    Ketones, UA 1+ (A) Negative mg/dL    Blood, UA 1+ (A) Negative unit/L    Bilirubin, UA Negative Negative mg/dL    Urobilinogen, UA 0.2 0.2, 1.0, Normal mg/dL    Nitrites, UA Positive (A) Negative    Leukocyte Esterase, UA 1+ (A) Negative unit/L   Urinalysis, Microscopic    Collection Time: 10/04/22  3:50 PM   Result Value Ref Range    RBC, UA 0-5 None Seen, 0-2, 3-5, 0-5 /HPF    WBC, UA 11-20 (A) None Seen, 0-2, 3-5, 0-5 /HPF    Squamous Epithelial Cells, UA <5 <=5 /HPF    Bacteria, UA 1+ (A) None Seen, Rare, Occasional /HPF    Yeast, UA Many (A) None Seen /HPF   POCT glucose    Collection Time: 10/04/22  5:20 PM   Result Value Ref Range    POCT Glucose 269 (H) 70 - 110 mg/dL   POCT glucose    Collection Time: 10/04/22  8:27 PM   Result Value Ref Range    POCT Glucose 390 (H) 70 - 110 mg/dL   POCT glucose    Collection Time: 10/04/22 10:16 PM   Result Value Ref Range    POCT Glucose 328 (H) 70 - 110 mg/dL   POCT glucose    Collection Time: 10/05/22  5:00 AM   Result Value Ref Range    POCT Glucose 77 70 - 110 mg/dL   Comprehensive metabolic panel    Collection Time: 10/05/22  5:15 AM   Result Value Ref Range    Sodium Level 139 136 - 145 mmol/L    Potassium Level 3.6 3.5 - 5.1 mmol/L    Chloride 113 (H) 98 - 107 mmol/L    Carbon Dioxide 22 22 - 29 mmol/L    Glucose Level 86 74 - 100 mg/dL    Blood Urea Nitrogen 35.3 (H) 8.9 - 20.6 mg/dL    Creatinine 2.57 (H) 0.73 - 1.18 mg/dL    Calcium Level Total 8.3 (L) 8.4 - 10.2 mg/dL    Protein Total 5.2 (L) 6.4 - 8.3 gm/dL    Albumin Level 2.4 (L) 3.5 - 5.0 gm/dL    Globulin 2.8 2.4 - 3.5 gm/dL     Albumin/Globulin Ratio 0.9 (L) 1.1 - 2.0 ratio    Bilirubin Total 0.2 <=1.5 mg/dL    Alkaline Phosphatase 123 40 - 150 unit/L    Alanine Aminotransferase 41 0 - 55 unit/L    Aspartate Aminotransferase 24 5 - 34 unit/L    eGFR 31 mls/min/1.73/m2   CBC with Differential    Collection Time: 10/05/22  6:31 AM   Result Value Ref Range    WBC 5.6 4.5 - 11.5 x10(3)/mcL    RBC 3.52 (L) 4.70 - 6.10 x10(6)/mcL    Hgb 10.9 (L) 14.0 - 18.0 gm/dL    Hct 32.5 (L) 42.0 - 52.0 %    MCV 92.3 80.0 - 94.0 fL    MCH 31.0 27.0 - 31.0 pg    MCHC 33.5 33.0 - 36.0 mg/dL    RDW 13.6 11.5 - 17.0 %    Platelet 225 130 - 400 x10(3)/mcL    MPV 11.2 (H) 7.4 - 10.4 fL    Neut % 62.3 %    Lymph % 25.4 %    Mono % 8.9 %    Eos % 2.3 %    Basophil % 0.7 %    Lymph # 1.42 0.6 - 4.6 x10(3)/mcL    Neut # 3.5 2.1 - 9.2 x10(3)/mcL    Mono # 0.50 0.1 - 1.3 x10(3)/mcL    Eos # 0.13 0 - 0.9 x10(3)/mcL    Baso # 0.04 0 - 0.2 x10(3)/mcL    IG# 0.02 0 - 0.04 x10(3)/mcL    IG% 0.4 %    NRBC% 0.0 %   POCT glucose    Collection Time: 10/05/22  7:00 AM   Result Value Ref Range    POCT Glucose 67 (L) 70 - 110 mg/dL   POCT glucose    Collection Time: 10/05/22  7:33 AM   Result Value Ref Range    POCT Glucose 171 (H) 70 - 110 mg/dL       No orders to display       Assessment/Plan:  Hematemesis, likely due to More Walden tear  - one episode hematemesis after experiencing n/v x3 days  - chronic vomiting per patient and family  - h/o grade B esophagitis as seen on EGD 04/23/21  - H&H 10.9/32.5  - continue PPI BID IV  - NPO  - EGD today  Gastroparesis - DM  - chronic issue  - NM gastric emptying study ordered in April 2021, but no report in chart - patient states he had this done at [facility]  Chronic constipation  - last BM 2 days ago  - ordered Colace BID  - ordered Amitiza once daily before breakfast    - normal saline ordered    Thank you for allowing us to participate in the care of Randall Hernandez.    Leticia Lamb, NP - DBS acting as scribe for CELINA Sommer  MD Hardeep  Gastroenterology  [facility]

## 2022-10-06 LAB
ANION GAP SERPL CALC-SCNC: 5 MEQ/L
BASOPHILS # BLD AUTO: 0.04 X10(3)/MCL (ref 0–0.2)
BASOPHILS NFR BLD AUTO: 0.6 %
BUN SERPL-MCNC: 25.8 MG/DL (ref 8.9–20.6)
CALCIUM SERPL-MCNC: 8.1 MG/DL (ref 8.4–10.2)
CHLORIDE SERPL-SCNC: 113 MMOL/L (ref 98–107)
CO2 SERPL-SCNC: 20 MMOL/L (ref 22–29)
CREAT SERPL-MCNC: 2.73 MG/DL (ref 0.73–1.18)
CREAT/UREA NIT SERPL: 9
EOSINOPHIL # BLD AUTO: 0.12 X10(3)/MCL (ref 0–0.9)
EOSINOPHIL NFR BLD AUTO: 1.9 %
ERYTHROCYTE [DISTWIDTH] IN BLOOD BY AUTOMATED COUNT: 13.8 % (ref 11.5–17)
GFR SERPLBLD CREATININE-BSD FMLA CKD-EPI: 29 MLS/MIN/1.73/M2
GLUCOSE SERPL-MCNC: 326 MG/DL (ref 74–100)
HCT VFR BLD AUTO: 37.5 % (ref 42–52)
HGB BLD-MCNC: 11.8 GM/DL (ref 14–18)
IMM GRANULOCYTES # BLD AUTO: 0.02 X10(3)/MCL (ref 0–0.04)
IMM GRANULOCYTES NFR BLD AUTO: 0.3 %
LYMPHOCYTES # BLD AUTO: 1.63 X10(3)/MCL (ref 0.6–4.6)
LYMPHOCYTES NFR BLD AUTO: 26.2 %
MCH RBC QN AUTO: 30.6 PG (ref 27–31)
MCHC RBC AUTO-ENTMCNC: 31.5 MG/DL (ref 33–36)
MCV RBC AUTO: 97.4 FL (ref 80–94)
MONOCYTES # BLD AUTO: 0.57 X10(3)/MCL (ref 0.1–1.3)
MONOCYTES NFR BLD AUTO: 9.1 %
NEUTROPHILS # BLD AUTO: 3.9 X10(3)/MCL (ref 2.1–9.2)
NEUTROPHILS NFR BLD AUTO: 61.9 %
NRBC BLD AUTO-RTO: 0 %
PLATELET # BLD AUTO: 223 X10(3)/MCL (ref 130–400)
PMV BLD AUTO: 11.4 FL (ref 7.4–10.4)
POCT GLUCOSE: 234 MG/DL (ref 70–110)
POCT GLUCOSE: 295 MG/DL (ref 70–110)
POCT GLUCOSE: 325 MG/DL (ref 70–110)
POCT GLUCOSE: 330 MG/DL (ref 70–110)
POTASSIUM SERPL-SCNC: 4.1 MMOL/L (ref 3.5–5.1)
RBC # BLD AUTO: 3.85 X10(6)/MCL (ref 4.7–6.1)
SODIUM SERPL-SCNC: 138 MMOL/L (ref 136–145)
WBC # SPEC AUTO: 6.2 X10(3)/MCL (ref 4.5–11.5)

## 2022-10-06 PROCEDURE — G0378 HOSPITAL OBSERVATION PER HR: HCPCS

## 2022-10-06 PROCEDURE — 96376 TX/PRO/DX INJ SAME DRUG ADON: CPT

## 2022-10-06 PROCEDURE — 25000003 PHARM REV CODE 250: Performed by: INTERNAL MEDICINE

## 2022-10-06 PROCEDURE — 63600175 PHARM REV CODE 636 W HCPCS: Performed by: INTERNAL MEDICINE

## 2022-10-06 PROCEDURE — 25000003 PHARM REV CODE 250: Performed by: NURSE PRACTITIONER

## 2022-10-06 PROCEDURE — 96372 THER/PROPH/DIAG INJ SC/IM: CPT | Performed by: INTERNAL MEDICINE

## 2022-10-06 PROCEDURE — 97163 PT EVAL HIGH COMPLEX 45 MIN: CPT

## 2022-10-06 PROCEDURE — 63600175 PHARM REV CODE 636 W HCPCS: Performed by: EMERGENCY MEDICINE

## 2022-10-06 PROCEDURE — 25000003 PHARM REV CODE 250: Performed by: STUDENT IN AN ORGANIZED HEALTH CARE EDUCATION/TRAINING PROGRAM

## 2022-10-06 PROCEDURE — 96372 THER/PROPH/DIAG INJ SC/IM: CPT | Performed by: STUDENT IN AN ORGANIZED HEALTH CARE EDUCATION/TRAINING PROGRAM

## 2022-10-06 PROCEDURE — 85025 COMPLETE CBC W/AUTO DIFF WBC: CPT | Performed by: INTERNAL MEDICINE

## 2022-10-06 PROCEDURE — C9113 INJ PANTOPRAZOLE SODIUM, VIA: HCPCS | Performed by: EMERGENCY MEDICINE

## 2022-10-06 PROCEDURE — 63600175 PHARM REV CODE 636 W HCPCS: Performed by: STUDENT IN AN ORGANIZED HEALTH CARE EDUCATION/TRAINING PROGRAM

## 2022-10-06 PROCEDURE — 80048 BASIC METABOLIC PNL TOTAL CA: CPT | Performed by: INTERNAL MEDICINE

## 2022-10-06 PROCEDURE — 36415 COLL VENOUS BLD VENIPUNCTURE: CPT | Performed by: INTERNAL MEDICINE

## 2022-10-06 RX ORDER — FLUDROCORTISONE ACETATE 0.1 MG/1
100 TABLET ORAL DAILY
Status: DISCONTINUED | OUTPATIENT
Start: 2022-10-07 | End: 2022-10-07

## 2022-10-06 RX ORDER — CARVEDILOL 3.12 MG/1
6.25 TABLET ORAL 2 TIMES DAILY WITH MEALS
Status: DISCONTINUED | OUTPATIENT
Start: 2022-10-06 | End: 2022-10-08

## 2022-10-06 RX ORDER — FLUDROCORTISONE ACETATE 0.1 MG/1
100 TABLET ORAL 2 TIMES DAILY
Status: DISCONTINUED | OUTPATIENT
Start: 2022-10-06 | End: 2022-10-06

## 2022-10-06 RX ORDER — AMLODIPINE BESYLATE 5 MG/1
5 TABLET ORAL DAILY
Status: DISCONTINUED | OUTPATIENT
Start: 2022-10-06 | End: 2022-10-10 | Stop reason: HOSPADM

## 2022-10-06 RX ORDER — CARVEDILOL 3.12 MG/1
3.12 TABLET ORAL ONCE
Status: COMPLETED | OUTPATIENT
Start: 2022-10-06 | End: 2022-10-06

## 2022-10-06 RX ADMIN — ERYTHROMYCIN ETHYLSUCCINATE 200 MG: 200 GRANULE, FOR SUSPENSION ORAL at 11:10

## 2022-10-06 RX ADMIN — INSULIN ASPART 8 UNITS: 100 INJECTION, SOLUTION INTRAVENOUS; SUBCUTANEOUS at 03:10

## 2022-10-06 RX ADMIN — LUBIPROSTONE 24 MCG: 24 CAPSULE, GELATIN COATED ORAL at 08:10

## 2022-10-06 RX ADMIN — OXYCODONE AND ACETAMINOPHEN 1 TABLET: 10; 325 TABLET ORAL at 08:10

## 2022-10-06 RX ADMIN — LABETALOL HYDROCHLORIDE 200 MG: 200 TABLET, FILM COATED ORAL at 05:10

## 2022-10-06 RX ADMIN — INSULIN DETEMIR 10 UNITS: 100 INJECTION, SOLUTION SUBCUTANEOUS at 08:10

## 2022-10-06 RX ADMIN — ONDANSETRON 4 MG: 2 INJECTION INTRAMUSCULAR; INTRAVENOUS at 11:10

## 2022-10-06 RX ADMIN — OXYCODONE AND ACETAMINOPHEN 1 TABLET: 10; 325 TABLET ORAL at 03:10

## 2022-10-06 RX ADMIN — AMLODIPINE BESYLATE 5 MG: 5 TABLET ORAL at 09:10

## 2022-10-06 RX ADMIN — PANTOPRAZOLE SODIUM 40 MG: 40 INJECTION, POWDER, LYOPHILIZED, FOR SOLUTION INTRAVENOUS at 08:10

## 2022-10-06 RX ADMIN — INSULIN ASPART 6 UNITS: 100 INJECTION, SOLUTION INTRAVENOUS; SUBCUTANEOUS at 05:10

## 2022-10-06 RX ADMIN — DOCUSATE SODIUM 50 MG: 50 CAPSULE, LIQUID FILLED ORAL at 08:10

## 2022-10-06 RX ADMIN — OXYCODONE HYDROCHLORIDE AND ACETAMINOPHEN 1 TABLET: 5; 325 TABLET ORAL at 05:10

## 2022-10-06 RX ADMIN — CARVEDILOL 3.12 MG: 3.12 TABLET, FILM COATED ORAL at 09:10

## 2022-10-06 NOTE — PLAN OF CARE
Problem: Physical Therapy  Goal: Physical Therapy Goal  Description: Goals to be met by: 10/30/22     Patient will increase functional independence with mobility by performin. Sit to stand transfer with Modified Douglas  2. Gait  x 400 feet with Modified Douglas using Single-point Cane vs walker.     Outcome: Ongoing, Progressing

## 2022-10-06 NOTE — PROGRESS NOTES
[facility] Hospital Medicine Progress Note        Chief Complaint: Inpatient follow-up on hematemesis    Subjective:  Patient is a 42-year-old white male with a past medical history of Insulin-dependent diabetes mellitus, diabetic retinopathy, diabetic gastroparesis and chronic kidney disease stage 4 who presented to the ER with 3 days of nausea and vomiting and 1 episode of bloody emesis.  He denies any prior history of GI bleeding.  He denied excessive NSAID use.  Since admission he has had no further episodes of hematemesis.  On arrival to the ER he was slightly tachycardic but hemodynamically stable and afebrile.  Laboratory work showed a hemoglobin of 14, mild acute on chronic kidney disease.  GI was consulted and recommended nothing by mouth.  Patient was very promptly admitted to the hospital medicine service.    Interval Hx:   No major changes since admission last night.  Patient is awaiting an EGD.  His afebrile, on room air, and hemodynamically stable.  Postural hypotension.     Objective/physical exam:  General:  Chronically ill-appearing white male who appears older than his stated age but is in no acute distress  HENT: normocephalic, atraumatic  Eye: PERRL, EOMI, clear conjunctiva  Neck: full ROM, no thyromegaly  Respiratory: clear to auscultation bilaterally  Cardiovascular: regular rate and rhythm  Gastrointestinal: non-distended, positive bowel sounds, non-tender  Musculoskeletal: no gross deformity  Genitourinary: Suprapubic catheter  Integumentary: warm, dry, intact, no rashes  Neurological: cranial nerves grossly intact, no focal neurological deficit  Psychiatric: cooperative, flat affect      VITAL SIGNS: 24 HRS MIN & MAX LAST   Temp  Min: 97.4 °F (36.3 °C)  Max: 98.2 °F (36.8 °C) 97.9 °F (36.6 °C)   BP  Min: 105/68  Max: 196/102 105/68     Pulse  Min: 83  Max: 105  104   Resp  Min: 16  Max: 20 20   SpO2  Min: 98 %  Max: 100 % 100 %       Recent Labs   Lab  10/04/22  1541 10/05/22  0631 10/06/22  0528   WBC 7.5 5.6 6.2   RBC 4.68* 3.52* 3.85*   HGB 14.6 10.9* 11.8*   HCT 44.9 32.5* 37.5*   MCV 95.9* 92.3 97.4*   MCH 31.2* 31.0 30.6   MCHC 32.5* 33.5 31.5*   RDW 13.6 13.6 13.8    225 223   MPV 12.3* 11.2* 11.4*         Recent Labs   Lab 10/04/22  1541 10/05/22  0515 10/06/22  0528    139 138   K 4.6 3.6 4.1   CO2 18* 22 20*   BUN 43.0* 35.3* 25.8*   CREATININE 3.28* 2.57* 2.73*   CALCIUM 8.7 8.3* 8.1*   MG 2.00  --   --    ALBUMIN 3.0* 2.4*  --    ALKPHOS 159* 123  --    ALT 58* 41  --    AST 29 24  --    BILITOT 0.2 0.2  --             Microbiology Results (last 7 days)       Procedure Component Value Units Date/Time    Urine culture [[id number]]  (Abnormal) Collected: 10/04/22 1550    Order Status: Completed Specimen: Urine, Supra Pubic Updated: 10/06/22 1010     Urine Culture >/= 100,000 colonies/ml Gram-negative Rods             See below for Radiology    Scheduled Med:   amLODIPine  5 mg Oral Daily    carvediloL  6.25 mg Oral BID WM    docusate sodium  50 mg Oral BID    erythromycin ethylsuccinate  200 mg Oral TID WM    insulin detemir U-100  10 Units Subcutaneous QHS    insulin detemir U-100  15 Units Subcutaneous Daily    lubiprostone  24 mcg Oral Daily with breakfast    pantoprazole  40 mg Intravenous BID    zolpidem  5 mg Oral QHS        Continuous Infusions:   sodium chloride 0.9% 75 mL/hr at 10/05/22 0919        PRN Meds:  dextrose 10%, dextrose 10%, dextrose 50%, glucagon (human recombinant), glucagon (human recombinant), haloperidol lactate, insulin aspart U-100, insulin aspart U-100, melatonin, ondansetron, oxyCODONE-acetaminophen, oxyCODONE-acetaminophen, promethazine, sodium chloride 0.9%       Assessment/Plan:  Hematemesis likely due to More-Walden tear   Chronic constipation  Insulin-dependent diabetes mellitus  Diabetic gastroparesis  Diabetic retinopathy with blindness  Diabetic neuropathy  Stage IV chronic kidney disease  Anemia of  chronic disease, the drop in hemoglobin is more likely due to dilution due to rehydration rather than actual blood loss  Essential hypertension   Neurogenic bladder with suprapubic catheter      Plan:  Patient is awaiting an EGD  Continue hydration and prokinetic agents and other supportive care   - autonomic neuropathy, will start fludrocortisone 0.5 mg daily.    -start detemir 15 units daily, 10 units at night with holding parameters.    -if control blood pressure and diabetes possible DC.    VTE prophylaxis:  SCDs    Patient condition:  Stable    Anticipated discharge and Disposition:     Home    All diagnosis and differential diagnosis have been reviewed; assessment and plan has been documented; I have personally reviewed the labs and test results that are presently available; I have reviewed the patients medication list; I have reviewed the consulting providers response and recommendations. I have reviewed or attempted to review medical records based upon their availability    All of the patient's questions have been  addressed and answered. Patient's is agreeable to the above stated plan. I will continue to monitor closely and make adjustments to medical management as needed.  _____________________________________________________________________    Nutrition Status:    Radiology:  US Retroperitoneal Complete  Retroperitoneal ultrasound.     HISTORY: Chronic renal disease.     FINDINGS: Grayscale imaging and color flow Doppler images are  available for interpretation.     Examination reveals right kidney to measure 8.9 x 4.9 x 5.4 cm is of  normal size shape and contour with no abnormal masses or  hydronephrosis.     Left kidney measures 9.7 x 6.3 x 5 cm it is of normal size shape and  contour with no abnormal masses or hydronephrosis.     Bladder was decompressed by a Crandall catheter.     IMPRESSION: No significant abnormalities identified      Electronically Signed By: Terence DIEZ Piotr  Date/Time Signed:  09/02/2021 11:52      Mario Ellis MD   10/06/2022

## 2022-10-06 NOTE — PROGRESS NOTES
Inpatient Nutrition Assessment    Admit Date: 10/4/2022   Total duration of encounter: 2 days     Nutrition Recommendation/Prescription     Encourage compliance with diabetic diet.   Medical management of CBGs.  Bowel regimen for constipation.  Consider prokinetic agent for GI motility.     Communication of Recommendations: reviewed with patient/caregiver and reviewed with nurse    Nutrition Assessment     Malnutrition Assessment/Nutrition-Focused Physical Exam    Malnutrition in the context of acute illness or injury  Degree of Malnutrition: non-severe (moderate) malnutrition  Energy Intake: </= 50% of estimated energy requirement for >/= 5 days  Interpretation of Weight Loss: >2% in 1 week  Body Fat:mild depletion  Area of Body Fat Loss: upper arm region - triceps / biceps  Muscle Mass Loss: mild depletion  Area of Muscle Mass Loss: temple region - temporalis muscle and clavicle bone region - pectoralis major, deltoid, trapezius muscles  Fluid Accumulation: does not meet criteria  Edema: does not meet criteria   Reduced  Strength: unable to obtain  A minimum of two characteristics is recommended for diagnosis of either severe or non-severe malnutrition.    Chart Review    Reason Seen: continuous nutrition monitoring    Diagnosis:  Hematemesis, resolved  Gastroparesis  Chronic constipation   Insulin-dependent diabetes mellitus    Relevant Medical History: Insulin-dependent diabetes mellitus, anemia of chronic disease, diabetic retinopathy, gastroparesis and chronic kidney disease stage 4    Nutrition-Related Medications: 0.9%NaCl @ 75ml/hr, docusate sodium, 10units detemir, pantoprazole, lubiprostone. PRN: glucagon, SSI, ondansetron, promethazine  Calorie Containing IV Medications: no significant kcals from medications at this time    Nutrition-Related Labs:  10/6/22 Cl 113, CO2 20, BUN 25.8, Crea 2.73, GFR 29, Gluc 326 (CBGs ), Ca 8.1    Diet/PN Order: Diet diabetic  Oral Supplement Order: none at this  "time  Tube Feeding Order: none at this time  Appetite/Oral Intake: fair/25-50% of meals  Factors Affecting Nutritional Intake: altered gastrointestinal function, nausea, and vomiting  Food/[place]ybeterian/Cultural Preferences: none reported    Skin Integrity: intact  Wound(s):   none documented    Comments    10/6/22 Pt tolerated liquids yesterday and today for breakfast. Mom reports frequent vomiting with gastroparesis but worsened the last few days. No further vomiting but reports constipation. With 8lb (7%) unintentional wt loss within the last few weeks. Willing to trial strawberry oral supplement.     Anthropometrics    Height: 5' 1" (154.9 cm) Height Method: Stated  Last Weight: 46.3 kg (102 lb) (10/05/22 1030) Weight Method: Stated  BMI (Calculated): 19.3  BMI Classification: normal (BMI 18.5-24.9)        Ideal Body Weight (IBW), Male: 112 lb     % Ideal Body Weight, Male (lb): 91.07 %                 Usual Body Weight (UBW), k kg  % Usual Body Weight: 92.73     Usual Weight Provided By: patient    Wt Readings from Last 5 Encounters:   10/05/22 46.3 kg (102 lb)   22 45.2 kg (99 lb 10.4 oz)   22 46.3 kg (102 lb 1.2 oz)   22 48.5 kg (106 lb 14.8 oz)   21 47.8 kg (105 lb 6.1 oz)     Weight Change(s) Since Admission:  Admit Weight: 45.1 kg (99 lb 8 oz) (10/04/22 1519)  10/6/22 45.1kg admit wt    Estimated Needs    Weight Used For Calorie Calculations: 46.3 kg (102 lb 1.2 oz)  Energy Calorie Requirements (kcal): 1620-1850kcals/d (35-40kcals/kg)  Energy Need Method: Kcal/kg  Weight Used For Protein Calculations: 46.3 kg (102 lb 1.2 oz)  Protein Requirements: 51-60g/d (1.1-1.3g/kg, CKD)  Fluid Requirements (mL): 1620-1850ml fl/d (35-40ml fl/kg)  Temp: 97.6 °F (36.4 °C)       Enteral Nutrition    Patient not receiving enteral nutrition at this time.    Parenteral Nutrition    Patient not receiving parenteral nutrition support at this time.    Evaluation of Received Nutrient Intake    Calories: " not meeting estimated needs  Protein: not meeting estimated needs    Patient Education    Not applicable.    Nutrition Diagnosis     PES: Malnutrition related to gastroparesis as evidenced by <50% nutritional needs x5 days, >2% wt loss x1wk, muscle and fat loss. (new)    Interventions/Goals     Intervention(s): modified composition of meals/snacks, commercial beverage, prescription medication, and collaboration with other providers  Goal: Meet greater than 75% of nutritional needs by follow-up. (new)    Monitoring & Evaluation     Dietitian will monitor energy intake, weight change, electrolyte/renal panel, and glucose/endocrine profile.  Nutrition Risk/Follow-Up: high (follow-up in 1-4 days)

## 2022-10-06 NOTE — PROGRESS NOTES
"Gastroenterology Progress Note    Subjective/Interval History:  Initial History of Present Illness (HPI) 10/05/22:  This is a 42 y.o. male with previous medical history of Insulin-dependent diabetes mellitus,  diabetic retinopathy, gastroparesis and chronic kidney disease stage 4 who presented to the ER with 3 days of nausea and vomiting [yellow and green emesis for the first 2 days] and 1 episode of bloody emesis [the third day].  He denies any prior history of GI bleeding.  He denied excessive NSAID use. He also reports dizziness when upright that he describes as "room spinning". Since admission he has had no further episodes of hematemesis.  On arrival to the ER he was slightly tachycardic but hemodynamically stable and afebrile.  Laboratory work showed a hemoglobin of 14, mild acute on chronic kidney disease.      Labs 10/05/22:  WBC 5.6  H&H 10.9/32.5  Plt 225  BUN 35.3  Creat 2.57  LFTs wnl  Urine: 4+ protein, 3+ glucose, 1+ ketones, 1+ occult blood, positive nitrite, 1+ leuk, 11-20 WBC, 1+ bacteria, many yeast     Patient is NPO and was placed on PPI BID IV during admission.  Patient lying in bed, family member at bedside. According to patient and family, patient experiences frequent vomiting d/t gastroparesis. Prior to admission, he had been vomiting for 3 days, with the first two days having yellow/green emesis. On the third day he experienced bright red blood when he vomited. Since admission patient vomited overnight but it is unknown if there was blood in this emesis.  Patient denies NSAID use, ETOH use, and blood thinners.  When asked if patient ever had a NM gastric emptying study as ordered by Dr. Marley post EGD in April 2021 [see below], patient states he may have had this done at [facility] but does not know the results.   Last BM 2 days ago. Patient does not know if stool contained blood or black color.   Patient experiences chronic constipation but does not take anything regularly for " constipation relief. Family member states that when he gets very constipated, she will give him an OTC laxative which helps.  Upon chart review, patient is allergic to metoclopramide.  Blood glucose is variable, most recent glucose 171. Patient does have continuous glucose monitor on right side of abd.     Imaging:  CT abd/pel 08/07/22: no evidence of acute abd or pel disease     SBFT 07/31/15: Increased small bowel transit time, but this is at least partially secondary to delayed emptying of the stomach. If clinically warranted, gastric emptying study could be considered for better gastric functional quantification.     Endoscopy:    EGD 04/23/21 with Dr. Marely: grade B esophagitis in the GE junction, mild erythema in the antrum compatible with gastritis, normal mucosa in the whole examined duodenum. Patient was given pantoprazole 40mg PO BID. Ordered NM gastric emptying study.     Colonoscopy in 2016 in [place] per chart review    EGD 10/05/22:  Findings:        The examined esophagus was normal. Estimated blood loss: none.        The stomach was normal.        The examined duodenum was normal.   Impression:            - Normal esophagus.                          - Normal stomach.                          - Normal examined duodenum.                          - No specimens collected.                          - No source of upper gi blood loss                          - No mechanical obstruction to explain                          nausea/vomiting   Recommendation:        - [facility].                          - Trial of erythromycin 200 prior to meals     10/06/22:  H&H 11.8/37.5  Patient denies BM.  Per nurse, patient's family member had brought him french fries, ice cream, and a sandwich/burger to eat last night although he was on a liquid/diabetic diet.  Patient denies n/v, abd pain. He started the erythromycin 200 trial. When this was explained to the patient and that I  "had noticed he had an allergy to reglan, he stated that some of his listed allergies were not true allergies but he does not specify further.    ROS:  Review of Systems   Respiratory:  Negative for cough and shortness of breath.    Cardiovascular:  Negative for chest pain.   Gastrointestinal:  Positive for constipation. Negative for abdominal pain, nausea and vomiting.   Neurological:  Positive for weakness.     Vital Signs:  BP (!) 171/90   Pulse 88   Temp 97.6 °F (36.4 °C) (Oral)   Resp 18   Ht 5' 1" (1.549 m)   Wt 46.3 kg (102 lb)   SpO2 100%   BMI 19.27 kg/m²   Body mass index is 19.27 kg/m².    Physical Exam:  Physical Exam  Constitutional:       General: He is not in acute distress.     Appearance: He is underweight.   HENT:      Head: Normocephalic and atraumatic.      Right Ear: External ear normal.      Left Ear: External ear normal.      Nose: Nose normal.      Mouth/Throat:      Pharynx: Oropharynx is clear.   Eyes:      Conjunctiva/sclera: Conjunctivae normal.   Cardiovascular:      Rate and Rhythm: Normal rate.   Pulmonary:      Effort: Pulmonary effort is normal. No respiratory distress.   Musculoskeletal:         General: Normal range of motion.   Skin:     General: Skin is warm and dry.   Neurological:      Mental Status: He is alert. Mental status is at baseline.      Motor: Weakness present.   Psychiatric:         Mood and Affect: Mood normal.       Labs:  Recent Results (from the past 24 hour(s))   POCT glucose    Collection Time: 10/05/22  2:54 PM   Result Value Ref Range    POCT Glucose 346 (H) 70 - 110 mg/dL   POCT glucose    Collection Time: 10/05/22  7:54 PM   Result Value Ref Range    POCT Glucose 338 (H) 70 - 110 mg/dL   Basic Metabolic Panel    Collection Time: 10/06/22  5:28 AM   Result Value Ref Range    Sodium Level 138 136 - 145 mmol/L    Potassium Level 4.1 3.5 - 5.1 mmol/L    Chloride 113 (H) 98 - 107 mmol/L    Carbon Dioxide 20 (L) 22 - 29 mmol/L    Glucose Level 326 (H) 74 - " 100 mg/dL    Blood Urea Nitrogen 25.8 (H) 8.9 - 20.6 mg/dL    Creatinine 2.73 (H) 0.73 - 1.18 mg/dL    BUN/Creatinine Ratio 9     Calcium Level Total 8.1 (L) 8.4 - 10.2 mg/dL    Anion Gap 5.0 mEq/L    eGFR 29 mls/min/1.73/m2   CBC with Differential    Collection Time: 10/06/22  5:28 AM   Result Value Ref Range    WBC 6.2 4.5 - 11.5 x10(3)/mcL    RBC 3.85 (L) 4.70 - 6.10 x10(6)/mcL    Hgb 11.8 (L) 14.0 - 18.0 gm/dL    Hct 37.5 (L) 42.0 - 52.0 %    MCV 97.4 (H) 80.0 - 94.0 fL    MCH 30.6 27.0 - 31.0 pg    MCHC 31.5 (L) 33.0 - 36.0 mg/dL    RDW 13.8 11.5 - 17.0 %    Platelet 223 130 - 400 x10(3)/mcL    MPV 11.4 (H) 7.4 - 10.4 fL    Neut % 61.9 %    Lymph % 26.2 %    Mono % 9.1 %    Eos % 1.9 %    Basophil % 0.6 %    Lymph # 1.63 0.6 - 4.6 x10(3)/mcL    Neut # 3.9 2.1 - 9.2 x10(3)/mcL    Mono # 0.57 0.1 - 1.3 x10(3)/mcL    Eos # 0.12 0 - 0.9 x10(3)/mcL    Baso # 0.04 0 - 0.2 x10(3)/mcL    IG# 0.02 0 - 0.04 x10(3)/mcL    IG% 0.3 %    NRBC% 0.0 %     Assessment/Plan:  Hematemesis, resolved  - one episode hematemesis after experiencing n/v x3 days  - chronic vomiting per patient and family  - h/o grade B esophagitis as seen on EGD 04/23/21  - EGD 10/05/22 normal  - H&H 11.8/37.5  - continue PPI BID IV  - continue diabetic diet  Gastroparesis - DM  - chronic issue  - NM gastric emptying study ordered in April 2021, but no report in chart - patient states he had this done at [facility]  - allergy to reglan per chart review - patient is unsure if this is a true allergy  - trial of erythromycin 200 prior to meals - started 10/05/22  Chronic constipation  - last BM 3 days ago  - ordered Colace BID  - ordered Amitiza once daily before breakfast  DM  - diabetic noncompliance  - blood glucose in 300s this nancy Lamb NP - DBS acting as scribe for HAN Meier MD  Gastroenterology  [facility]

## 2022-10-06 NOTE — PLAN OF CARE
"10/6 1034- No answer to hospital rm phone.   1035- Reviewed observation (MOON) letter by cell w/ pt and his mother, Bessie. Email to NOAT- "Called to review YANES letter- pts mother says he also has Medicaid. She thinks its a dual coverage w/ [employer]. Pls investigate and load in system if needed. " MOON + attachment loaded to . Emailed dc planner to drop a copy of both to  when able. LOPEZ Sage  "

## 2022-10-06 NOTE — PT/OT/SLP EVAL
Physical Therapy Evaluation    Patient Name:  Randall Hernandez   MRN:  [id number]    Recommendations:     Discharge Recommendations:   (pending treatment tomorrow. Pt unable to tolerate ambulating in hallway today 2/2 +orthostatic hypotension)   Discharge Equipment Recommendations: none   Barriers to discharge:  medical status    Assessment:     Randall Hernandez is a 42 y.o. male admitted with a medical diagnosis of Hematemesis with nausea s/p EGD->results are normal. Pt admits to multiple falls at home prior to admit. Pt reports falling backwards when he does fall. Pt admits to +dizziness, lightheadedness at home. Orthostatic vital signs taken today and were +. Pt very dizzy and lightheaded when standing today; NOT safe to ambulate by himself at this time. HIGH fall risk.  He presents with the following impairments/functional limitations:  weakness, impaired endurance, impaired functional mobility, impaired balance .    Rehab Prognosis: Fair; patient would benefit from acute skilled PT services to address these deficits and reach maximum level of function.    Recent Surgery: Procedure(s) (LRB):  EGD (N/A) 1 Day Post-Op    Plan:     During this hospitalization, patient to be seen daily to address the identified rehab impairments via gait training, therapeutic activities, therapeutic exercises and progress toward the following goals:    Plan of Care Expires:  12/03/22    Subjective     Chief Complaint: lightheadedness  Patient/Family Comments/goals: get stronger; reduce falls    Patients cultural, spiritual, Jain conflicts given the current situation: no    Living Environment:  Pt lives at home w/ mom and moms . Pt used a cane or walker to ambulate at home. Pt admits to frequent falls. 6 steps to enter home w/ B handrails.  Upon discharge, patient will have assistance from mother.    Objective:     Communicated with RN prior to session.  Patient found sitting edge of bed upon PT entry to room.    General  Precautions: Standard,  (monitor orthostatic vital signs)   Respiratory Status: Room air    Orthostatic vital signs:  Supine: 170/99  Sittin/90  Standin/64 (pt unable to tolerate standing for complete BP reading 2/2 +dizziness, lightheadedness_    Exams:  RLE ROM: WFL  RLE Strength: 4/5  LLE ROM: WFL  LLE Strength: 4/5    Functional Mobility:  Bed Mobility:     Sit to Supine: modified independence  Transfers:     Sit to Stand:  contact guard assistance with rolling walker  Gait: Pt ambulated to toilet in room and back to bed. Pt insisted on ambulating to toilet 2/2 +BM. RN called to room 2/2 pt w/ symptomatic orthostatic hypotension. RN present throughout.    Patient left HOB elevated with all lines intact.    GOALS:   Multidisciplinary Problems       Physical Therapy Goals          Problem: Physical Therapy    Goal Priority Disciplines Outcome Goal Variances Interventions   Physical Therapy Goal     PT, PT/OT Ongoing, Progressing     Description: Goals to be met by: 10/30/22     Patient will increase functional independence with mobility by performin. Sit to stand transfer with Modified [place]  2. Gait  x 400 feet with Modified [place] using Single-point Cane .                          History:     Past Medical History:   Diagnosis Date    Anemia in CKD (chronic kidney disease)     Back pain     Blind     DM (diabetes mellitus)     Gastroparesis     HTN (hypertension)     Neuropathy     Obesity     Suprapubic catheter     Urine retention        Past Surgical History:   Procedure Laterality Date    AV FISTULA PLACEMENT      CHOLECYSTECTOMY      DEBRIDEMENT      INSERTION OF SUPRAPUBIC CATHETER         Time Tracking:     PT Received On: 10/06/22  PT Start Time: 1126     PT Stop Time: 1207  PT Total Time (min): 41 min     Billable Minutes: Evaluation 41      10/06/2022

## 2022-10-07 LAB
ALBUMIN SERPL-MCNC: 2.3 GM/DL (ref 3.5–5)
ALBUMIN/GLOB SERPL: 0.9 RATIO (ref 1.1–2)
ALP SERPL-CCNC: 109 UNIT/L (ref 40–150)
ALT SERPL-CCNC: 37 UNIT/L (ref 0–55)
AST SERPL-CCNC: 31 UNIT/L (ref 5–34)
BASOPHILS # BLD AUTO: 0.05 X10(3)/MCL (ref 0–0.2)
BASOPHILS NFR BLD AUTO: 0.9 %
BILIRUBIN DIRECT+TOT PNL SERPL-MCNC: 0.1 MG/DL
BUN SERPL-MCNC: 25.7 MG/DL (ref 8.9–20.6)
CALCIUM SERPL-MCNC: 8.1 MG/DL (ref 8.4–10.2)
CHLORIDE SERPL-SCNC: 110 MMOL/L (ref 98–107)
CO2 SERPL-SCNC: 22 MMOL/L (ref 22–29)
CREAT SERPL-MCNC: 2.34 MG/DL (ref 0.73–1.18)
EOSINOPHIL # BLD AUTO: 0.16 X10(3)/MCL (ref 0–0.9)
EOSINOPHIL NFR BLD AUTO: 2.9 %
ERYTHROCYTE [DISTWIDTH] IN BLOOD BY AUTOMATED COUNT: 13.6 % (ref 11.5–17)
GFR SERPLBLD CREATININE-BSD FMLA CKD-EPI: 35 MLS/MIN/1.73/M2
GLOBULIN SER-MCNC: 2.6 GM/DL (ref 2.4–3.5)
GLUCOSE SERPL-MCNC: 150 MG/DL (ref 74–100)
HCT VFR BLD AUTO: 32.7 % (ref 42–52)
HGB BLD-MCNC: 10.8 GM/DL (ref 14–18)
IMM GRANULOCYTES # BLD AUTO: 0.02 X10(3)/MCL (ref 0–0.04)
IMM GRANULOCYTES NFR BLD AUTO: 0.4 %
LYMPHOCYTES # BLD AUTO: 1.75 X10(3)/MCL (ref 0.6–4.6)
LYMPHOCYTES NFR BLD AUTO: 32.2 %
MCH RBC QN AUTO: 31.2 PG (ref 27–31)
MCHC RBC AUTO-ENTMCNC: 33 MG/DL (ref 33–36)
MCV RBC AUTO: 94.5 FL (ref 80–94)
MONOCYTES # BLD AUTO: 0.54 X10(3)/MCL (ref 0.1–1.3)
MONOCYTES NFR BLD AUTO: 9.9 %
NEUTROPHILS # BLD AUTO: 2.9 X10(3)/MCL (ref 2.1–9.2)
NEUTROPHILS NFR BLD AUTO: 53.7 %
NRBC BLD AUTO-RTO: 0 %
PLATELET # BLD AUTO: 182 X10(3)/MCL (ref 130–400)
PMV BLD AUTO: 12.2 FL (ref 7.4–10.4)
POCT GLUCOSE: 118 MG/DL (ref 70–110)
POCT GLUCOSE: 131 MG/DL (ref 70–110)
POCT GLUCOSE: 165 MG/DL (ref 70–110)
POCT GLUCOSE: 199 MG/DL (ref 70–110)
POCT GLUCOSE: 202 MG/DL (ref 70–110)
POCT GLUCOSE: 88 MG/DL (ref 70–110)
POTASSIUM SERPL-SCNC: 4.3 MMOL/L (ref 3.5–5.1)
PROT SERPL-MCNC: 4.9 GM/DL (ref 6.4–8.3)
RBC # BLD AUTO: 3.46 X10(6)/MCL (ref 4.7–6.1)
SODIUM SERPL-SCNC: 138 MMOL/L (ref 136–145)
WBC # SPEC AUTO: 5.4 X10(3)/MCL (ref 4.5–11.5)

## 2022-10-07 PROCEDURE — 96372 THER/PROPH/DIAG INJ SC/IM: CPT | Performed by: INTERNAL MEDICINE

## 2022-10-07 PROCEDURE — 63600175 PHARM REV CODE 636 W HCPCS: Performed by: STUDENT IN AN ORGANIZED HEALTH CARE EDUCATION/TRAINING PROGRAM

## 2022-10-07 PROCEDURE — 25000003 PHARM REV CODE 250: Performed by: STUDENT IN AN ORGANIZED HEALTH CARE EDUCATION/TRAINING PROGRAM

## 2022-10-07 PROCEDURE — 25000003 PHARM REV CODE 250: Performed by: INTERNAL MEDICINE

## 2022-10-07 PROCEDURE — 63600175 PHARM REV CODE 636 W HCPCS: Performed by: INTERNAL MEDICINE

## 2022-10-07 PROCEDURE — 63600175 PHARM REV CODE 636 W HCPCS: Performed by: EMERGENCY MEDICINE

## 2022-10-07 PROCEDURE — 85025 COMPLETE CBC W/AUTO DIFF WBC: CPT | Performed by: STUDENT IN AN ORGANIZED HEALTH CARE EDUCATION/TRAINING PROGRAM

## 2022-10-07 PROCEDURE — 80053 COMPREHEN METABOLIC PANEL: CPT | Performed by: STUDENT IN AN ORGANIZED HEALTH CARE EDUCATION/TRAINING PROGRAM

## 2022-10-07 PROCEDURE — 96372 THER/PROPH/DIAG INJ SC/IM: CPT | Performed by: STUDENT IN AN ORGANIZED HEALTH CARE EDUCATION/TRAINING PROGRAM

## 2022-10-07 PROCEDURE — 97166 OT EVAL MOD COMPLEX 45 MIN: CPT

## 2022-10-07 PROCEDURE — C9113 INJ PANTOPRAZOLE SODIUM, VIA: HCPCS | Performed by: EMERGENCY MEDICINE

## 2022-10-07 PROCEDURE — 97530 THERAPEUTIC ACTIVITIES: CPT | Mod: CQ

## 2022-10-07 PROCEDURE — 25000003 PHARM REV CODE 250: Performed by: NURSE PRACTITIONER

## 2022-10-07 PROCEDURE — 96376 TX/PRO/DX INJ SAME DRUG ADON: CPT

## 2022-10-07 PROCEDURE — G0378 HOSPITAL OBSERVATION PER HR: HCPCS

## 2022-10-07 PROCEDURE — 36415 COLL VENOUS BLD VENIPUNCTURE: CPT | Performed by: STUDENT IN AN ORGANIZED HEALTH CARE EDUCATION/TRAINING PROGRAM

## 2022-10-07 RX ORDER — HYDRALAZINE HYDROCHLORIDE 20 MG/ML
10 INJECTION INTRAMUSCULAR; INTRAVENOUS EVERY 4 HOURS PRN
Status: DISCONTINUED | OUTPATIENT
Start: 2022-10-07 | End: 2022-10-10 | Stop reason: HOSPADM

## 2022-10-07 RX ORDER — FLUDROCORTISONE ACETATE 0.1 MG/1
200 TABLET ORAL DAILY
Status: DISCONTINUED | OUTPATIENT
Start: 2022-10-08 | End: 2022-10-08

## 2022-10-07 RX ORDER — LISINOPRIL 5 MG/1
5 TABLET ORAL DAILY
Status: DISCONTINUED | OUTPATIENT
Start: 2022-10-07 | End: 2022-10-08

## 2022-10-07 RX ORDER — ENALAPRILAT 1.25 MG/ML
1.25 INJECTION INTRAVENOUS ONCE
Status: DISCONTINUED | OUTPATIENT
Start: 2022-10-07 | End: 2022-10-08

## 2022-10-07 RX ADMIN — PANTOPRAZOLE SODIUM 40 MG: 40 INJECTION, POWDER, LYOPHILIZED, FOR SOLUTION INTRAVENOUS at 08:10

## 2022-10-07 RX ADMIN — ONDANSETRON 4 MG: 2 INJECTION INTRAMUSCULAR; INTRAVENOUS at 04:10

## 2022-10-07 RX ADMIN — INSULIN DETEMIR 10 UNITS: 100 INJECTION, SOLUTION SUBCUTANEOUS at 08:10

## 2022-10-07 RX ADMIN — ERYTHROMYCIN ETHYLSUCCINATE 200 MG: 200 GRANULE, FOR SUSPENSION ORAL at 05:10

## 2022-10-07 RX ADMIN — ERYTHROMYCIN ETHYLSUCCINATE 200 MG: 200 GRANULE, FOR SUSPENSION ORAL at 12:10

## 2022-10-07 RX ADMIN — CARVEDILOL 6.25 MG: 3.12 TABLET, FILM COATED ORAL at 08:10

## 2022-10-07 RX ADMIN — INSULIN ASPART 4 UNITS: 100 INJECTION, SOLUTION INTRAVENOUS; SUBCUTANEOUS at 03:10

## 2022-10-07 RX ADMIN — PROMETHAZINE HYDROCHLORIDE 25 MG: 25 INJECTION INTRAMUSCULAR; INTRAVENOUS at 07:10

## 2022-10-07 RX ADMIN — INSULIN ASPART 2 UNITS: 100 INJECTION, SOLUTION INTRAVENOUS; SUBCUTANEOUS at 12:10

## 2022-10-07 RX ADMIN — LISINOPRIL 5 MG: 5 TABLET ORAL at 08:10

## 2022-10-07 RX ADMIN — AMLODIPINE BESYLATE 5 MG: 5 TABLET ORAL at 08:10

## 2022-10-07 RX ADMIN — CARVEDILOL 6.25 MG: 3.12 TABLET, FILM COATED ORAL at 04:10

## 2022-10-07 RX ADMIN — OXYCODONE AND ACETAMINOPHEN 1 TABLET: 10; 325 TABLET ORAL at 08:10

## 2022-10-07 RX ADMIN — FLUDROCORTISONE ACETATE 100 MCG: 0.1 TABLET ORAL at 08:10

## 2022-10-07 NOTE — NURSING
"Educated pt and mother multiple times throughout the day that the pt needs to stick to his diabetic diet. The pt has a continuous blood sugar machine and checks multiple times each hour. The mother is giving the pt insulin per his request. I educated them multiple times that this is unacceptable and the mother needed to bring the medicine home or we would have to lock it up. Pt's blood pressure has been high throughout the day. The pt refuses to take BP meds because he states, "his nephrologist told him not too."  "

## 2022-10-07 NOTE — PROGRESS NOTES
[facility] Hospital Medicine Progress Note        Chief Complaint: Inpatient follow-up on hematemesis    Subjective:  Patient is a 42-year-old white male with a past medical history of Insulin-dependent diabetes mellitus, diabetic retinopathy, diabetic gastroparesis and chronic kidney disease stage 4 who presented to the ER with 3 days of nausea and vomiting and 1 episode of bloody emesis.  He denies any prior history of GI bleeding.  He denied excessive NSAID use.  Since admission he has had no further episodes of hematemesis.  On arrival to the ER he was slightly tachycardic but hemodynamically stable and afebrile.  Laboratory work showed a hemoglobin of 14, mild acute on chronic kidney disease.  GI was consulted and recommended nothing by mouth.  Patient was very promptly admitted to the hospital medicine service.    Interval Hx:   Seen and examined the patient.  Afebrile vital stable and hemodynamically stable.  He is still orthostatic due to chronic neuropathy.    Started fludrocortisone yesterday.    Objective/physical exam:  General:  Chronically ill-appearing white male who appears older than his stated age but is in no acute distress  HENT: normocephalic, atraumatic  Eye: PERRL, EOMI, clear conjunctiva  Neck: full ROM, no thyromegaly  Respiratory: clear to auscultation bilaterally  Cardiovascular: regular rate and rhythm  Gastrointestinal: non-distended, positive bowel sounds, non-tender  Musculoskeletal: no gross deformity  Genitourinary: Suprapubic catheter  Integumentary: warm, dry, intact, no rashes  Neurological: cranial nerves grossly intact, no focal neurological deficit  Psychiatric: cooperative, flat affect      VITAL SIGNS: 24 HRS MIN & MAX LAST   Temp  Min: 97.9 °F (36.6 °C)  Max: 98.3 °F (36.8 °C) 98.1 °F (36.7 °C)   BP  Min: 105/68  Max: 193/91 (!) 159/87     Pulse  Min: 87  Max: 104  91   Resp  Min: 16  Max: 20 20   SpO2  Min: 98 %  Max: 100 % 98 %       Recent  Labs   Lab 10/05/22  0631 10/06/22  0528 10/07/22  0409   WBC 5.6 6.2 5.4   RBC 3.52* 3.85* 3.46*   HGB 10.9* 11.8* 10.8*   HCT 32.5* 37.5* 32.7*   MCV 92.3 97.4* 94.5*   MCH 31.0 30.6 31.2*   MCHC 33.5 31.5* 33.0   RDW 13.6 13.8 13.6    223 182   MPV 11.2* 11.4* 12.2*         Recent Labs   Lab 10/04/22  1541 10/05/22  0515 10/06/22  0528 10/07/22  0409    139 138 138   K 4.6 3.6 4.1 4.3   CO2 18* 22 20* 22   BUN 43.0* 35.3* 25.8* 25.7*   CREATININE 3.28* 2.57* 2.73* 2.34*   CALCIUM 8.7 8.3* 8.1* 8.1*   MG 2.00  --   --   --    ALBUMIN 3.0* 2.4*  --  2.3*   ALKPHOS 159* 123  --  109   ALT 58* 41  --  37   AST 29 24  --  31   BILITOT 0.2 0.2  --  0.1            Microbiology Results (last 7 days)       Procedure Component Value Units Date/Time    Urine culture [[id number]]  (Abnormal)  (Susceptibility) Collected: 10/04/22 1550    Order Status: Completed Specimen: Urine, Supra Pubic Updated: 10/07/22 0913     Urine Culture >/= 100,000 colonies/ml Pseudomonas aeruginosa             See below for Radiology    Scheduled Med:   amLODIPine  5 mg Oral Daily    carvediloL  6.25 mg Oral BID WM    docusate sodium  50 mg Oral BID    erythromycin ethylsuccinate  200 mg Oral TID WM    fludrocortisone  100 mcg Oral Daily    insulin detemir U-100  10 Units Subcutaneous QHS    insulin detemir U-100  15 Units Subcutaneous Daily    lubiprostone  24 mcg Oral Daily with breakfast    pantoprazole  40 mg Intravenous BID    zolpidem  5 mg Oral QHS        Continuous Infusions:   sodium chloride 0.9% 75 mL/hr at 10/05/22 0919        PRN Meds:  dextrose 10%, dextrose 10%, dextrose 50%, glucagon (human recombinant), glucagon (human recombinant), haloperidol lactate, insulin aspart U-100, insulin aspart U-100, melatonin, ondansetron, oxyCODONE-acetaminophen, oxyCODONE-acetaminophen, promethazine, sodium chloride 0.9%       Assessment/Plan:  Hematemesis likely due to [place] tear   Autonomic neuropathy  Chronic  constipation  Insulin-dependent diabetes mellitus  Diabetic gastroparesis  Diabetic retinopathy with blindness  Diabetic neuropathy  Stage IV chronic kidney disease  Anemia of chronic disease, the drop in hemoglobin is more likely due to dilution due to rehydration rather than actual blood loss  Essential hypertension   Neurogenic bladder with suprapubic catheter      Plan:  Patient is awaiting an EGD  - will increase the fludrocortisone 0.2 mg  Continue hydration and prokinetic agents and other supportive care   -start detemir 15 units daily, 10 units at night with holding parameters.    -if control blood pressure and diabetes possible DC.    VTE prophylaxis:  SCDs    Patient condition:  Stable    Anticipated discharge and Disposition:     Home    All diagnosis and differential diagnosis have been reviewed; assessment and plan has been documented; I have personally reviewed the labs and test results that are presently available; I have reviewed the patients medication list; I have reviewed the consulting providers response and recommendations. I have reviewed or attempted to review medical records based upon their availability    All of the patient's questions have been  addressed and answered. Patient's is agreeable to the above stated plan. I will continue to monitor closely and make adjustments to medical management as needed.  _____________________________________________________________________    Nutrition Status:    Radiology:  US Retroperitoneal Complete  Retroperitoneal ultrasound.     HISTORY: Chronic renal disease.     FINDINGS: Grayscale imaging and color flow Doppler images are  available for interpretation.     Examination reveals right kidney to measure 8.9 x 4.9 x 5.4 cm is of  normal size shape and contour with no abnormal masses or  hydronephrosis.     Left kidney measures 9.7 x 6.3 x 5 cm it is of normal size shape and  contour with no abnormal masses or hydronephrosis.     Bladder was  decompressed by a [place] catheter.     IMPRESSION: No significant abnormalities identified      Electronically Signed By: Piotr Pratt MD  Date/Time Signed: 09/02/2021 11:52      Mario Ellis MD   10/07/2022

## 2022-10-07 NOTE — PLAN OF CARE
Problem: Occupational Therapy  Goal: Occupational Therapy Goal  Description: Goals to be met by: 11/11/22     Patient will increase functional independence with ADLs by performing:    Grooming while standing at sink with Modified [place].  Toileting from bedside commode with Modified [place] for hygiene and clothing management. Progress to toilet as appropriate  Bathing from  shower chair/bench with Modified [place].  Toilet transfer to bedside commode with Modified [place]. Progress to toilet as appropriate      Outcome: Ongoing, Progressing

## 2022-10-07 NOTE — PT/OT/SLP EVAL
"Occupational Therapy   Evaluation    Name: Randall Hernandez  MRN: [id number]  Admitting Diagnosis:  Hematemesis with nausea  Recent Surgery: Procedure(s) (LRB):  EGD (N/A) 2 Days Post-Op    Recommendations:     Discharge Recommendations: home with home health, rehabilitation facility  Discharge Equipment Recommendations:  none  Barriers to discharge:       Assessment:     Randall Hernandez is a 42 y.o. male with a medical diagnosis of Hematemesis with nausea.  He presents with body aches, weakness, and dizziness with activity. Pt reports he has recently fallen numerous times at home and hit his head with LOC unknown. Pt also reports various instances of falling across the course of his life such as "falling (jumping?) out of a moving truck". Performance deficits affecting function: weakness, impaired endurance, impaired self care skills, impaired functional mobility, visual deficits, decreased safety awareness, pain.      Rehab Prognosis: Good; patient would benefit from acute skilled OT services to address these deficits and reach maximum level of function.       Plan:     Patient to be seen 3 x/week, 5 x/week to address the above listed problems via self-care/home management, therapeutic activities, therapeutic exercises  Plan of Care Expires: 11/11/22  Plan of Care Reviewed with: patient, mother    Subjective     Chief Complaint: Pain, Dizziness   Patient/Family Comments/goals: To return home.     Occupational Profile:  Living Environment: Lives with mother, 4-5 steps with rails on both sides to enter  Previous level of function: Independent with assistance from mother as needed  Roles and Routines: Unstated  Equipment Used at Home:  walker, rolling, cane, straight, shower chair  Assistance upon Discharge: Mother reports she is around to assist patient as needed but states patient is not always receptive.     Pain/Comfort:  Location 1:  (pt reports body aches without number)  Pain Addressed 1: Distraction, " Reposition    Patients cultural, spiritual, Jain conflicts given the current situation: no    Objective:     Communicated with: nsg and PT prior to session.  Patient found HOB elevated with chowdhury catheter, telemetry, colostomy upon OT entry to room.    General Precautions: Standard, fall   Orthopedic Precautions:    Braces:    Respiratory Status: Room air    Occupational Performance:    Bed Mobility:    Patient completed Supine to Sit with stand by assistance    Functional Mobility/Transfers:  Patient completed Sit <> Stand Transfer with contact guard assistance  with  hand-held assist   Patient completed Bed <> Chair Transfer using Step Transfer technique with stand by assistance and contact guard assistance with hand-held assist  Functional Mobility: Pt stood @ EOB with CGA for ~5-10 min with HHA   Supine - 170/91, HR 87  Sitting @ EOB - 139/88, HR 93  Standing @ EOB after performing marching activity (CGA) 118/74,   Sitting EOB after standing ~ 5-10 min @ EOB - 149/84, HR 99    Activities of Daily Living:  Upper Body Dressing: supervision - Pt donned beanie onto head @ EOB  Lower Body Dressing: stand by assistance and minimum assistance - Pt donned sweat pants seated @ EOB , standing to pull up- min/CGA    Cognitive/Visual Perceptual:  Follows multi step commands  Visual - Pt reports his vision is very blurry, changes depending on color of objects, amount of light in the room, distance from object, etc. (Chronic)    Physical Exam:  B UE WFL    Treatment & Education:  Pt and mother educated on POC and safety.     Patient left up in chair with all lines intact, call button in reach, and mother present    GOALS:   Multidisciplinary Problems       Occupational Therapy Goals          Problem: Occupational Therapy    Goal Priority Disciplines Outcome Interventions   Occupational Therapy Goal     OT, PT/OT Ongoing, Progressing    Description: Goals to be met by: 11/11/22     Patient will increase functional  independence with ADLs by performing:    Grooming while standing at sink with Modified [place].  Toileting from bedside commode with Modified [place] for hygiene and clothing management. Progress to toilet as appropriate  Bathing from  shower chair/bench with Modified [place].  Toilet transfer to bedside commode with Modified [place]. Progress to toilet as appropriate                           History:     Past Medical History:   Diagnosis Date    Anemia in CKD (chronic kidney disease)     Back pain     Blind     DM (diabetes mellitus)     Gastroparesis     HTN (hypertension)     Neuropathy     Obesity     Suprapubic catheter     Urine retention          Past Surgical History:   Procedure Laterality Date    AV FISTULA PLACEMENT      CHOLECYSTECTOMY      DEBRIDEMENT      ESOPHAGOGASTRODUODENOSCOPY N/A 10/5/2022    Procedure: EGD;  Surgeon: Colby Meier MD;  Location: [facility] ENDOSCOPY;  Service: Gastroenterology;  Laterality: N/A;    INSERTION OF SUPRAPUBIC CATHETER         Time Tracking:     OT Date of Treatment:    OT Start Time: 0907  OT Stop Time: 0930  OT Total Time (min): 23 min    Billable Minutes:Evaluation Mod Complexity - 23 min.     10/7/2022  Note edited by SABRA Katz

## 2022-10-07 NOTE — CLINICAL REVIEW
42-year-old male admitted on 10/04 with hematemesis.  History of insulin-dependent DM, diabetic retinopathy, gastroparesis, CKD stage 4.  GI saw him and recommended EGD.  H&H has remained stable.  A KI on CKD has resolved.  H&H did drop, however, this was related to initial hemoconcentration.  His creatinine went from 3.28-2.57-2.34.  Nine months ago, his creatinine was 2.63.  As such, this represents his baseline.  The patient remains appropriate for observation level of care        Samy Kam MD  Utilization Management  Physician Advisor

## 2022-10-07 NOTE — PT/OT/SLP PROGRESS
Physical Therapy Treatment    Patient Name:  Randall Hernandez   MRN:  [id number]    Recommendations:     Discharge Recommendations:  Home with assistance  Discharge Equipment Recommendations: none   Barriers to discharge: orthostatic hypotension and increased risk of falling    Assessment:     Randall Hernandez is a 42 y.o. male admitted with a medical diagnosis of Hematemesis with nausea.  He presents with the following impairments/functional limitations:  weakness, impaired endurance, impaired functional mobility, gait instability, impaired balance.    Pt sitting up in bed with mother at bedside. Pleasant and agreeable to tx session. Pt remains orthostatic although able to tolerate standing at bedside for ~ 5 minutes prior to needing to rest d/t dizziness. Marched in place at bedside as well. MD arrived during tx session to speak to pt and mother regarding trial of new medication to help with BP. Pt does not seem receptive to calling for assistance to mobilize d/t risk of falls especially given history of falls. Pt also refuses gait belt.    Rehab Prognosis: Good; patient would benefit from acute skilled PT services to address these deficits and reach maximum level of function.    Recent Surgery: Procedure(s) (LRB):  EGD (N/A) 2 Days Post-Op    Plan:     During this hospitalization, patient to be seen daily to address the identified rehab impairments via gait training, therapeutic activities, therapeutic exercises and progress toward the following goals:    Plan of Care Expires:  12/03/22    Subjective     Chief Complaint: generalized pain  Patient/Family Comments/goals: to go home  Pain/Comfort:  Pain Rating 1: other (see comments) (reports generalized chronic pain; did not rate pain)      Objective:     Communicated with RN prior to session.  Patient found HOB elevated with chowdhury catheter, telemetry upon PT entry to room.     General Precautions: Standard, fall   Orthopedic Precautions:N/A   Braces: N/A  Respiratory  Status: Room air  Supine:   Sitting:   Standing:   After standing ~ 2 minutes:   87, 170/71  93, 139/88  94, 136/83  102, 118/74     Functional Mobility:  Bed Mobility:    Supine<->sit; mod I  Transfers:  Sit<->stand; CGA to SBA  Stand step bed->recliner with CGA  Balance: CGA for standing balance d/t hypotension; remained asymptomatic for several minutes; reluctant to march in place initially but tolerated fairly well.       Patient left up in chair with all lines intact and call button in reach. Mother present and instructed them to call for assistance to reduce risk of falling.    GOALS:   Multidisciplinary Problems       Physical Therapy Goals          Problem: Physical Therapy    Goal Priority Disciplines Outcome Goal Variances Interventions   Physical Therapy Goal     PT, PT/OT Ongoing, Progressing     Description: Goals to be met by: 10/30/22     Patient will increase functional independence with mobility by performin. Sit to stand transfer with Modified [place]  2. Gait  x 400 feet with Modified [place] using Single-point Cane .                          Time Tracking:     PT Received On: 10/07/22  PT Start Time: 906     PT Stop Time: 930  PT Total Time (min): 24 min     Billable Minutes: Therapeutic Activity 2 units    Treatment Type: Treatment  PT/PTA: PTA     PTA Visit Number: 1     10/07/2022

## 2022-10-07 NOTE — PLAN OF CARE
Problem: Adult Inpatient Plan of Care  Goal: Absence of Hospital-Acquired Illness or Injury  Outcome: Ongoing, Progressing  Goal: Optimal Comfort and Wellbeing  Outcome: Ongoing, Progressing     Problem: Adult Inpatient Plan of Care  Goal: Plan of Care Review  Outcome: Ongoing, Not Progressing  Flowsheets (Taken 10/6/2022 2107)  Plan of Care Reviewed With:   patient   mother  Goal: Readiness for Transition of Care  Outcome: Ongoing, Not Progressing

## 2022-10-08 LAB
ANION GAP SERPL CALC-SCNC: 10 MEQ/L
BACTERIA UR CULT: ABNORMAL
BASOPHILS # BLD AUTO: 0.05 X10(3)/MCL (ref 0–0.2)
BASOPHILS NFR BLD AUTO: 0.6 %
BUN SERPL-MCNC: 33.1 MG/DL (ref 8.9–20.6)
CALCIUM SERPL-MCNC: 7.9 MG/DL (ref 8.4–10.2)
CHLORIDE SERPL-SCNC: 105 MMOL/L (ref 98–107)
CO2 SERPL-SCNC: 21 MMOL/L (ref 22–29)
CREAT SERPL-MCNC: 2.63 MG/DL (ref 0.73–1.18)
CREAT/UREA NIT SERPL: 13
EOSINOPHIL # BLD AUTO: 0.1 X10(3)/MCL (ref 0–0.9)
EOSINOPHIL NFR BLD AUTO: 1.2 %
ERYTHROCYTE [DISTWIDTH] IN BLOOD BY AUTOMATED COUNT: 13.3 % (ref 11.5–17)
GFR SERPLBLD CREATININE-BSD FMLA CKD-EPI: 30 MLS/MIN/1.73/M2
GLUCOSE SERPL-MCNC: 341 MG/DL (ref 74–100)
HCT VFR BLD AUTO: 34.2 % (ref 42–52)
HGB BLD-MCNC: 11.3 GM/DL (ref 14–18)
IMM GRANULOCYTES # BLD AUTO: 0.03 X10(3)/MCL (ref 0–0.04)
IMM GRANULOCYTES NFR BLD AUTO: 0.4 %
LYMPHOCYTES # BLD AUTO: 0.95 X10(3)/MCL (ref 0.6–4.6)
LYMPHOCYTES NFR BLD AUTO: 11.7 %
MCH RBC QN AUTO: 30.9 PG (ref 27–31)
MCHC RBC AUTO-ENTMCNC: 33 MG/DL (ref 33–36)
MCV RBC AUTO: 93.4 FL (ref 80–94)
MONOCYTES # BLD AUTO: 0.55 X10(3)/MCL (ref 0.1–1.3)
MONOCYTES NFR BLD AUTO: 6.8 %
NEUTROPHILS # BLD AUTO: 6.4 X10(3)/MCL (ref 2.1–9.2)
NEUTROPHILS NFR BLD AUTO: 79.3 %
NRBC BLD AUTO-RTO: 0 %
PLATELET # BLD AUTO: 213 X10(3)/MCL (ref 130–400)
PMV BLD AUTO: 11.3 FL (ref 7.4–10.4)
POCT GLUCOSE: 100 MG/DL (ref 70–110)
POCT GLUCOSE: 205 MG/DL (ref 70–110)
POCT GLUCOSE: 253 MG/DL (ref 70–110)
POCT GLUCOSE: 338 MG/DL (ref 70–110)
POCT GLUCOSE: 425 MG/DL (ref 70–110)
POCT GLUCOSE: 432 MG/DL (ref 70–110)
POCT GLUCOSE: 46 MG/DL (ref 70–110)
POCT GLUCOSE: 55 MG/DL (ref 70–110)
POCT GLUCOSE: 57 MG/DL (ref 70–110)
POCT GLUCOSE: 62 MG/DL (ref 70–110)
POTASSIUM SERPL-SCNC: 5 MMOL/L (ref 3.5–5.1)
RBC # BLD AUTO: 3.66 X10(6)/MCL (ref 4.7–6.1)
SODIUM SERPL-SCNC: 136 MMOL/L (ref 136–145)
WBC # SPEC AUTO: 8.1 X10(3)/MCL (ref 4.5–11.5)

## 2022-10-08 PROCEDURE — 96372 THER/PROPH/DIAG INJ SC/IM: CPT | Performed by: INTERNAL MEDICINE

## 2022-10-08 PROCEDURE — 25000003 PHARM REV CODE 250: Performed by: INTERNAL MEDICINE

## 2022-10-08 PROCEDURE — 36415 COLL VENOUS BLD VENIPUNCTURE: CPT | Performed by: STUDENT IN AN ORGANIZED HEALTH CARE EDUCATION/TRAINING PROGRAM

## 2022-10-08 PROCEDURE — 80048 BASIC METABOLIC PNL TOTAL CA: CPT | Performed by: STUDENT IN AN ORGANIZED HEALTH CARE EDUCATION/TRAINING PROGRAM

## 2022-10-08 PROCEDURE — 99214 OFFICE O/P EST MOD 30 MIN: CPT | Mod: ,,, | Performed by: INTERNAL MEDICINE

## 2022-10-08 PROCEDURE — C9113 INJ PANTOPRAZOLE SODIUM, VIA: HCPCS | Performed by: EMERGENCY MEDICINE

## 2022-10-08 PROCEDURE — 96376 TX/PRO/DX INJ SAME DRUG ADON: CPT

## 2022-10-08 PROCEDURE — 63600175 PHARM REV CODE 636 W HCPCS: Performed by: EMERGENCY MEDICINE

## 2022-10-08 PROCEDURE — 25000003 PHARM REV CODE 250: Performed by: STUDENT IN AN ORGANIZED HEALTH CARE EDUCATION/TRAINING PROGRAM

## 2022-10-08 PROCEDURE — G0378 HOSPITAL OBSERVATION PER HR: HCPCS

## 2022-10-08 PROCEDURE — 25000003 PHARM REV CODE 250: Performed by: NURSE PRACTITIONER

## 2022-10-08 PROCEDURE — 63600175 PHARM REV CODE 636 W HCPCS: Performed by: INTERNAL MEDICINE

## 2022-10-08 PROCEDURE — 99214 PR OFFICE/OUTPT VISIT, EST, LEVL IV, 30-39 MIN: ICD-10-PCS | Mod: ,,, | Performed by: INTERNAL MEDICINE

## 2022-10-08 PROCEDURE — 85025 COMPLETE CBC W/AUTO DIFF WBC: CPT | Performed by: STUDENT IN AN ORGANIZED HEALTH CARE EDUCATION/TRAINING PROGRAM

## 2022-10-08 RX ORDER — CARVEDILOL 12.5 MG/1
12.5 TABLET ORAL 2 TIMES DAILY WITH MEALS
Status: DISCONTINUED | OUTPATIENT
Start: 2022-10-08 | End: 2022-10-10 | Stop reason: HOSPADM

## 2022-10-08 RX ADMIN — GLUCAGON HYDROCHLORIDE 1 MG: KIT at 08:10

## 2022-10-08 RX ADMIN — GLUCAGON HYDROCHLORIDE 1 MG: KIT at 06:10

## 2022-10-08 RX ADMIN — ERYTHROMYCIN ETHYLSUCCINATE 200 MG: 200 GRANULE, FOR SUSPENSION ORAL at 12:10

## 2022-10-08 RX ADMIN — LUBIPROSTONE 24 MCG: 24 CAPSULE, GELATIN COATED ORAL at 10:10

## 2022-10-08 RX ADMIN — CARVEDILOL 12.5 MG: 12.5 TABLET, FILM COATED ORAL at 05:10

## 2022-10-08 RX ADMIN — LISINOPRIL 5 MG: 5 TABLET ORAL at 10:10

## 2022-10-08 RX ADMIN — ZOLPIDEM TARTRATE 5 MG: 5 TABLET ORAL at 08:10

## 2022-10-08 RX ADMIN — AMLODIPINE BESYLATE 5 MG: 5 TABLET ORAL at 10:10

## 2022-10-08 RX ADMIN — ERYTHROMYCIN ETHYLSUCCINATE 200 MG: 200 GRANULE, FOR SUSPENSION ORAL at 10:10

## 2022-10-08 RX ADMIN — PANTOPRAZOLE SODIUM 40 MG: 40 INJECTION, POWDER, LYOPHILIZED, FOR SOLUTION INTRAVENOUS at 10:10

## 2022-10-08 RX ADMIN — OXYCODONE HYDROCHLORIDE AND ACETAMINOPHEN 1 TABLET: 5; 325 TABLET ORAL at 10:10

## 2022-10-08 RX ADMIN — INSULIN ASPART 5 UNITS: 100 INJECTION, SOLUTION INTRAVENOUS; SUBCUTANEOUS at 04:10

## 2022-10-08 RX ADMIN — INSULIN ASPART 5 UNITS: 100 INJECTION, SOLUTION INTRAVENOUS; SUBCUTANEOUS at 12:10

## 2022-10-08 RX ADMIN — ERYTHROMYCIN ETHYLSUCCINATE 200 MG: 200 GRANULE, FOR SUSPENSION ORAL at 04:10

## 2022-10-08 RX ADMIN — DOCUSATE SODIUM 50 MG: 50 CAPSULE, LIQUID FILLED ORAL at 10:10

## 2022-10-08 NOTE — CONSULTS
[facility] [place]    Patient Name: Randall Hernandez  Admission Date: 10/4/2022  Hospital Length of Stay: 1 days  Code Status: Full Code   Attending Physician: Itz Kevin MD   Primary Care Physician: Juvenal Otto MD  Principal Problem:Hematemesis with nausea    HPI:    Randall Hernandez 42 y.o. male  has a past medical history of Anemia in CKD (chronic kidney disease), Back pain, Blind, DM (diabetes mellitus), Gastroparesis, HTN (hypertension), Neuropathy, Suprapubic catheter, and Urine retention. Presented with hematemesis and  vomiting Generalized abd tenderness.  Denies diarrhea. Denies alcohol use, - blood thinners. Hx DM1, CKD, suprapubic cath in place. +chills. Denies chest pain, fever, sob. ). GI le suggesting seema ingram tear.  Patient is known to our practice he has CKD 4 status post left upper arm AV fistula  He is known to have severe diabetic nephropathy neuropathy autonomic dysfunction  Recently we had to cut down his blood pressure medications      We have been consulted for blood pressure management due to severe orthostasis with blood pressure dropping significantly upon standing up and being very elevated in the supine position.  Of note gradually increasing doses of Florinef has been instituted by primary team  Patient currently denies any major complaints except for occasional sleepiness mild suprapubic discomfort and labile blood sugar control        Medical History:   Past Medical History:   Diagnosis Date    Anemia in CKD (chronic kidney disease)     Back pain     Blind     DM (diabetes mellitus)     Gastroparesis     HTN (hypertension)     Neuropathy     Obesity     Suprapubic catheter     Urine retention      Diabetic nephropathy neuropathy   questionable demyelinating disease  Autonomic dysfunction labile blood pressure  Surgical History:   Past Surgical History:   Procedure Laterality Date    AV FISTULA PLACEMENT      CHOLECYSTECTOMY      DEBRIDEMENT       ESOPHAGOGASTRODUODENOSCOPY N/A 10/5/2022    Procedure: EGD;  Surgeon: Colby Meier MD;  Location[facility] ENDOSCOPY;  Service: Gastroenterology;  Laterality: N/A;    INSERTION OF SUPRAPUBIC CATHETER         Family History:   Family History   Problem Relation Age of Onset    Arrhythmia Mother     Hypertension Mother     Arrhythmia Father    .     Social History:   Social History     Tobacco Use    Smoking status: Every Day    Smokeless tobacco: Never   Substance Use Topics    Alcohol use: Never       Allergies:  Review of patient's allergies indicates:   Allergen Reactions    Ivp dye [iodinated contrast media]     Metoclopramide      Other reaction(s): weakness    Ceftriaxone Rash     Other reaction(s): Other (See Comments)  Joint pain, chest tightness, numbness: hands, feet, throat, blurred vision, lightheaded   Other reaction(s): Other (See Comments)  Joint pain, chest tightness, numbness: hands, feet, throat, blurred vision, lightheaded   Joint pain, chest tightness, numbness: hands, feet, throat, blurred vision, lightheaded   Other reaction(s): Other (See Comments)  Joint pain, chest tightness, numbness: hands, feet, throat, blurred vision, lightheaded            Review of Systems:  Constitutional: Denies fever, fatigue, chills  Skin: Denies wounds, rashes, no skin lesions or itching  EENT:  Diabetic retinopathy with blindness  Respiratory:  Denies cough, shortness of breath, or wheezing  Cardiovascular: Denies chest pain, palpitations, or swelling  Gastrointestional:  Less vomiting no hematemesis today or yesterday, mild suprapubic discomfort  Genitourinary:  Patient has bladder dysfunction he is status post suprapubic catheter carefully monitored and followed up by  services  Musculoskeletal: Denies myalgias, back pain, more weakness upon standing up  Neurological: Denies headaches, seizures, peripheral blindness.  Hematological: Denies unusual bruising or bleeding  Psychiatric: Denies psychiatric  concerns, hallucinations, or depression; no confusion    Medications:    Current Facility-Administered Medications:     amLODIPine tablet 5 mg, 5 mg, Oral, Daily, Mario Ellis MD, 5 mg at 10/07/22 0855    carvediloL tablet 12.5 mg, 12.5 mg, Oral, BID WM, Patric Watkins MD    dextrose 10% bolus 125 mL, 12.5 g, Intravenous, PRN, Kemar Lamb MD    dextrose 10% bolus 250 mL, 25 g, Intravenous, PRN, Kemar Lamb MD    dextrose 50% injection 12.5 g, 12.5 g, Intravenous, PRN, Kemar Lamb MD    docusate sodium capsule 50 mg, 50 mg, Oral, BID, Leticia Lamb, LETI, 50 mg at 10/06/22 0832    enalaprilat injection 1.25 mg, 1.25 mg, Intravenous, Once, Mario Ellis MD    erythromycin ethylsuccinate 200 mg/5 mL suspension 200 mg, 200 mg, Oral, TID WM, Colby Meier MD, 200 mg at 10/07/22 1713    glucagon (human recombinant) injection 1 mg, 1 mg, Intramuscular, PRN, Kemar Lamb MD, 1 mg at 10/08/22 0643    glucagon (human recombinant) injection 1 mg, 1 mg, Intramuscular, PRN, Kemar Lamb MD    haloperidol lactate injection 2 mg, 2 mg, Intramuscular, Q4H PRN, Kemar Lamb MD    hydrALAZINE injection 10 mg, 10 mg, Intravenous, Q4H PRN, Mario Ellis MD    insulin aspart U-100 injection 1-10 Units, 1-10 Units, Subcutaneous, Q6H PRN, Kemar Lamb MD, 5 Units at 10/08/22 0019    insulin aspart U-100 injection 1-10 Units, 1-10 Units, Subcutaneous, Q6H PRN, Kemar Lamb MD, 8 Units at 10/05/22 1502    insulin detemir U-100 injection 10 Units, 10 Units, Subcutaneous, QHS, Mario Ellis MD, 10 Units at 10/07/22 2015    insulin detemir U-100 injection 15 Units, 15 Units, Subcutaneous, Daily, Mario Ellis MD    lisinopriL tablet 5 mg, 5 mg, Oral, Daily, Mario Ellis MD, 5 mg at 10/07/22 2015    lubiprostone capsule 24 mcg, 24 mcg, Oral, Daily with breakfast, LETI Fuentes, 24 mcg at 10/06/22 0832    melatonin tablet 6 mg, 6 mg, Oral, Nightly PRN, Kemar BAKER  "MD Adonis    ondansetron injection 4 mg, 4 mg, Intravenous, Q6H PRN, Kemar Lamb MD, 4 mg at 10/07/22 1657    oxyCODONE-acetaminophen  mg per tablet 1 tablet, 1 tablet, Oral, Q4H PRN, Itz Kevin MD, 1 tablet at 10/07/22 2015    oxyCODONE-acetaminophen 5-325 mg per tablet 1 tablet, 1 tablet, Oral, Q4H PRN, Itz Kevin MD, 1 tablet at 10/06/22 0526    pantoprazole injection 40 mg, 40 mg, Intravenous, BID, Corrine Balbuena MD, 40 mg at 10/07/22 2015    promethazine injection 25 mg, 25 mg, Intramuscular, Q6H PRN, Kemar Lamb MD, 25 mg at 10/07/22 1957    sodium chloride 0.9% flush 10 mL, 10 mL, Intravenous, PRN, Kemar Lamb MD    zolpidem tablet 5 mg, 5 mg, Oral, QHS, Itz Kevin MD, 5 mg at 10/05/22 2051     Scheduled Meds:   amLODIPine  5 mg Oral Daily    carvediloL  12.5 mg Oral BID WM    docusate sodium  50 mg Oral BID    enalaprilat  1.25 mg Intravenous Once    erythromycin ethylsuccinate  200 mg Oral TID WM    insulin detemir U-100  10 Units Subcutaneous QHS    insulin detemir U-100  15 Units Subcutaneous Daily    lisinopriL  5 mg Oral Daily    lubiprostone  24 mcg Oral Daily with breakfast    pantoprazole  40 mg Intravenous BID    zolpidem  5 mg Oral QHS     Continuous Infusions:      Objective:  /83   Pulse 92   Temp 98.1 °F (36.7 °C) (Oral)   Resp 18   Ht 5' 1" (1.549 m)   Wt 46.3 kg (102 lb)   SpO2 98%   BMI 19.27 kg/m²  Body mass index is 19.27 kg/m².    I/O last 3 completed shifts:  In: 1611 [P.O.:1610; I.V.:1]  Out: 1900 [Urine:1900]  No intake/output data recorded.        Physical Exam:  General: no acute distress, awake, alert  Eyes:  Peripheral blindness.  Pupils react.  Extraocular muscles intact.  HENT: atraumatic, oropharynx and nasal mucosa patent, no difficulty hearing  Neck: full ROM, no JVD, no thyromegaly or lymphadenopathy  Respiratory: equal, unlabored, clear to auscultation A/P  Cardiovascular: RRR without  rub; BL " radial and pedal pulses felt  Edema: none  Gastrointestinal: soft, non-tender, non-distended; positive bowel sounds; no masses to palpation  Genitourinary:  Suprapubic catheter  Musculoskeletal: full ROM without limitation or discomfort  Integumentary: warm, dry; no rashes, wounds, or skin lesions  Neurological: oriented, appropriate, no acute focal motor or sensory deficits  Dialysis access:  Left upper arm AV fistula not mature yet    Labs:  Chemistries:   Recent Labs   Lab 10/04/22  1541 10/05/22  0515 10/06/22  0528 10/07/22  0409    139 138 138   K 4.6 3.6 4.1 4.3   CO2 18* 22 20* 22   BUN 43.0* 35.3* 25.8* 25.7*   CREATININE 3.28* 2.57* 2.73* 2.34*   CALCIUM 8.7 8.3* 8.1* 8.1*   BILITOT 0.2 0.2  --  0.1   ALKPHOS 159* 123  --  109   ALT 58* 41  --  37   AST 29 24  --  31   GLUCOSE 316* 86 326* 150*   MG 2.00  --   --   --         CBC/Anemia Labs: Coags:    Recent Labs   Lab 10/05/22  0631 10/06/22  0528 10/07/22  0409   WBC 5.6 6.2 5.4   HGB 10.9* 11.8* 10.8*   HCT 32.5* 37.5* 32.7*    223 182   MCV 92.3 97.4* 94.5*   RDW 13.6 13.8 13.6    Recent Labs   Lab 10/04/22  1541   INR 0.97        POCT Glucose: HbA1c:    Recent Labs   Lab 10/07/22  1959 10/08/22  0013 10/08/22  0340 10/08/22  0613 10/08/22  0638 10/08/22  0711   POCTGLUCOSE 253* 425* 100 57* 46* 205*    Hemoglobin A1C   Date Value Ref Range Status   10/14/2021 11.1 (H) <=6.5 % Final        No results for input(s): COLORU, CLARITYU, SPECGRAV, PHUR, PROTEINUA, GLUCOSEU, BILIRUBINCON, BLOODU, WBCU, RBCU, BACTERIA, MUCUS, NITRITE, LEUKOCYTESUR, UROBILINOGEN, HYALINECASTS in the last 24 hours.      Impression:    Patient Active Problem List   Diagnosis    Hematemesis with nausea     CKD 4 related to diabetic nephropathy.  Diabetic retinopathy  Diabetic neuropathy  Autonomic dysfunction related to diabetes  Labile blood pressure    Plan:     I would be very careful with Andrez since his blood pressure goes more than 200 supine especially with  increasing the doses of Florinef  In the interim I went ahead discontinued Florinef increase the Coreg to 12.5 b.i.d.  Of note patient previously did not tolerate ACE inhibitors due to hyperkalemia and worsening renal dysfunction  But since he is in the hospital we will keep monitoring renal function and situation very close    Will do orthostatics every 6-8 hours and carefully recalibrate blood pressure medications   I did instruct the mother and the patient that any time the blood pressure drops on the lower side patient needs to sit down especially if he goes home    Patric Watkins      Thank you for this consult.

## 2022-10-08 NOTE — PROGRESS NOTES
[facility] Hospital Medicine Progress Note        Chief Complaint: Inpatient follow-up on hematemesis    Subjective:  Patient is a 42-year-old white male with a past medical history of Insulin-dependent diabetes mellitus, diabetic retinopathy, diabetic gastroparesis and chronic kidney disease stage 4 who presented to the ER with 3 days of nausea and vomiting and 1 episode of bloody emesis.  He denies any prior history of GI bleeding.  He denied excessive NSAID use.  Since admission he has had no further episodes of hematemesis.  On arrival to the ER he was slightly tachycardic but hemodynamically stable and afebrile.  Laboratory work showed a hemoglobin of 14, mild acute on chronic kidney disease.  GI was consulted and recommended nothing by mouth.  Patient was very promptly admitted to the hospital medicine service.    Interval Hx:   BP is better controlled today, hypoglycemia,   He is doing well, eating in the bed.     Objective/physical exam:  General:  Chronically ill-appearing white male who appears older than his stated age but is in no acute distress  HENT: normocephalic, atraumatic  Eye: PERRL, EOMI, clear conjunctiva  Neck: full ROM, no thyromegaly  Respiratory: clear to auscultation bilaterally  Cardiovascular: regular rate and rhythm  Gastrointestinal: non-distended, positive bowel sounds, non-tender  Musculoskeletal: no gross deformity  Genitourinary: Suprapubic catheter  Integumentary: warm, dry, intact, no rashes  Neurological: cranial nerves grossly intact, no focal neurological deficit  Psychiatric: cooperative, flat affect      VITAL SIGNS: 24 HRS MIN & MAX LAST   Temp  Min: 98.1 °F (36.7 °C)  Max: 98.5 °F (36.9 °C) 98.5 °F (36.9 °C)   BP  Min: 130/83  Max: 199/106 (!) 153/93     Pulse  Min: 92  Max: 103  93   Resp  Min: 16  Max: 18 18   SpO2  Min: 97 %  Max: 100 % 97 %       Recent Labs   Lab 10/06/22  0528 10/07/22  0409 10/08/22  1117   WBC 6.2 5.4 8.1   RBC 3.85*  3.46* 3.66*   HGB 11.8* 10.8* 11.3*   HCT 37.5* 32.7* 34.2*   MCV 97.4* 94.5* 93.4   MCH 30.6 31.2* 30.9   MCHC 31.5* 33.0 33.0   RDW 13.8 13.6 13.3    182 213   MPV 11.4* 12.2* 11.3*         Recent Labs   Lab 10/04/22  1541 10/05/22  0515 10/06/22  0528 10/07/22  0409 10/08/22  1117    139 138 138 136   K 4.6 3.6 4.1 4.3 5.0   CO2 18* 22 20* 22 21*   BUN 43.0* 35.3* 25.8* 25.7* 33.1*   CREATININE 3.28* 2.57* 2.73* 2.34* 2.63*   CALCIUM 8.7 8.3* 8.1* 8.1* 7.9*   MG 2.00  --   --   --   --    ALBUMIN 3.0* 2.4*  --  2.3*  --    ALKPHOS 159* 123  --  109  --    ALT 58* 41  --  37  --    AST 29 24  --  31  --    BILITOT 0.2 0.2  --  0.1  --             Microbiology Results (last 7 days)       Procedure Component Value Units Date/Time    Urine culture [[id number]]  (Abnormal)  (Susceptibility) Collected: 10/04/22 1550    Order Status: Completed Specimen: Urine, Supra Pubic Updated: 10/08/22 1142     Urine Culture >/= 100,000 colonies/ml Pseudomonas aeruginosa             See below for Radiology    Scheduled Med:   amLODIPine  5 mg Oral Daily    carvediloL  12.5 mg Oral BID WM    docusate sodium  50 mg Oral BID    erythromycin ethylsuccinate  200 mg Oral TID WM    insulin detemir U-100  10 Units Subcutaneous QHS    insulin detemir U-100  15 Units Subcutaneous Daily    lubiprostone  24 mcg Oral Daily with breakfast    pantoprazole  40 mg Intravenous BID    zolpidem  5 mg Oral QHS        Continuous Infusions:         PRN Meds:  dextrose 10%, dextrose 10%, dextrose 50%, glucagon (human recombinant), glucagon (human recombinant), haloperidol lactate, hydrALAZINE, insulin aspart U-100, insulin aspart U-100, melatonin, ondansetron, oxyCODONE-acetaminophen, oxyCODONE-acetaminophen, promethazine, sodium chloride 0.9%       Assessment/Plan:  Hematemesis likely due to More-Walden tear   Autonomic neuropathy  Chronic constipation  Insulin-dependent diabetes mellitus  Diabetic gastroparesis  Diabetic retinopathy with  blindness  Diabetic neuropathy  Stage IV chronic kidney disease  Anemia of chronic disease, the drop in hemoglobin is more likely due to dilution due to rehydration rather than actual blood loss  Essential hypertension   Neurogenic bladder with suprapubic catheter      Plan:  -Giving the severe hypertension at rest will discontinue fludrocortisone for now.  -Nephrology is on board adjusting medications.    -Will discontinue 10 units long-acting insulin at night.  Continue sliding scale.    -Detemir 15 units daily,  -Orthostatic hypotension is chronic and it will probably not be sold in this admission he has multiple severe neuropathy due to diabetes including autonomic neuropathy.  - Will monitor more night with current blood pressure regiment and insulin regiment.  Possible DC tomorrow if improving    VTE prophylaxis:  SCDs    Patient condition:  Stable    Anticipated discharge and Disposition:     Home    All diagnosis and differential diagnosis have been reviewed; assessment and plan has been documented; I have personally reviewed the labs and test results that are presently available; I have reviewed the patients medication list; I have reviewed the consulting providers response and recommendations. I have reviewed or attempted to review medical records based upon their availability    All of the patient's questions have been  addressed and answered. Patient's is agreeable to the above stated plan. I will continue to monitor closely and make adjustments to medical management as needed.  _____________________________________________________________________    Nutrition Status:    Radiology:  US Retroperitoneal Complete  Retroperitoneal ultrasound.     HISTORY: Chronic renal disease.     FINDINGS: Grayscale imaging and color flow Doppler images are  available for interpretation.     Examination reveals right kidney to measure 8.9 x 4.9 x 5.4 cm is of  normal size shape and contour with no abnormal masses  or  hydronephrosis.     Left kidney measures 9.7 x 6.3 x 5 cm it is of normal size shape and  contour with no abnormal masses or hydronephrosis.     Bladder was decompressed by a Crandall catheter.     IMPRESSION: No significant abnormalities identified      Electronically Signed By: Piotr Pratt MD  Date/Time Signed: 09/02/2021 11:52      Mario Ellis MD   10/08/2022

## 2022-10-09 LAB
ALBUMIN SERPL-MCNC: 2.4 GM/DL (ref 3.5–5)
ALBUMIN/GLOB SERPL: 0.9 RATIO (ref 1.1–2)
ALP SERPL-CCNC: 106 UNIT/L (ref 40–150)
ALT SERPL-CCNC: 50 UNIT/L (ref 0–55)
AST SERPL-CCNC: 61 UNIT/L (ref 5–34)
BASOPHILS # BLD AUTO: 0.04 X10(3)/MCL (ref 0–0.2)
BASOPHILS NFR BLD AUTO: 0.6 %
BILIRUBIN DIRECT+TOT PNL SERPL-MCNC: 0.3 MG/DL
BUN SERPL-MCNC: 34.1 MG/DL (ref 8.9–20.6)
CALCIUM SERPL-MCNC: 8.2 MG/DL (ref 8.4–10.2)
CHLORIDE SERPL-SCNC: 109 MMOL/L (ref 98–107)
CO2 SERPL-SCNC: 23 MMOL/L (ref 22–29)
CREAT SERPL-MCNC: 2.55 MG/DL (ref 0.73–1.18)
EOSINOPHIL # BLD AUTO: 0.19 X10(3)/MCL (ref 0–0.9)
EOSINOPHIL NFR BLD AUTO: 2.9 %
ERYTHROCYTE [DISTWIDTH] IN BLOOD BY AUTOMATED COUNT: 13.2 % (ref 11.5–17)
GFR SERPLBLD CREATININE-BSD FMLA CKD-EPI: 32 MLS/MIN/1.73/M2
GLOBULIN SER-MCNC: 2.6 GM/DL (ref 2.4–3.5)
GLUCOSE SERPL-MCNC: 93 MG/DL (ref 74–100)
HCT VFR BLD AUTO: 36.8 % (ref 42–52)
HGB BLD-MCNC: 12.2 GM/DL (ref 14–18)
IMM GRANULOCYTES # BLD AUTO: 0.02 X10(3)/MCL (ref 0–0.04)
IMM GRANULOCYTES NFR BLD AUTO: 0.3 %
LYMPHOCYTES # BLD AUTO: 1.36 X10(3)/MCL (ref 0.6–4.6)
LYMPHOCYTES NFR BLD AUTO: 20.9 %
MCH RBC QN AUTO: 30.7 PG (ref 27–31)
MCHC RBC AUTO-ENTMCNC: 33.2 MG/DL (ref 33–36)
MCV RBC AUTO: 92.5 FL (ref 80–94)
MONOCYTES # BLD AUTO: 0.69 X10(3)/MCL (ref 0.1–1.3)
MONOCYTES NFR BLD AUTO: 10.6 %
NEUTROPHILS # BLD AUTO: 4.2 X10(3)/MCL (ref 2.1–9.2)
NEUTROPHILS NFR BLD AUTO: 64.7 %
NRBC BLD AUTO-RTO: 0 %
PLATELET # BLD AUTO: 180 X10(3)/MCL (ref 130–400)
PMV BLD AUTO: 11.4 FL (ref 7.4–10.4)
POCT GLUCOSE: 230 MG/DL (ref 70–110)
POCT GLUCOSE: 323 MG/DL (ref 70–110)
POCT GLUCOSE: 373 MG/DL (ref 70–110)
POCT GLUCOSE: 448 MG/DL (ref 70–110)
POTASSIUM SERPL-SCNC: 4.4 MMOL/L (ref 3.5–5.1)
PROT SERPL-MCNC: 5 GM/DL (ref 6.4–8.3)
RBC # BLD AUTO: 3.98 X10(6)/MCL (ref 4.7–6.1)
SODIUM SERPL-SCNC: 139 MMOL/L (ref 136–145)
WBC # SPEC AUTO: 6.5 X10(3)/MCL (ref 4.5–11.5)

## 2022-10-09 PROCEDURE — 96376 TX/PRO/DX INJ SAME DRUG ADON: CPT

## 2022-10-09 PROCEDURE — 63600175 PHARM REV CODE 636 W HCPCS: Performed by: INTERNAL MEDICINE

## 2022-10-09 PROCEDURE — 85025 COMPLETE CBC W/AUTO DIFF WBC: CPT | Performed by: STUDENT IN AN ORGANIZED HEALTH CARE EDUCATION/TRAINING PROGRAM

## 2022-10-09 PROCEDURE — 63600175 PHARM REV CODE 636 W HCPCS: Performed by: EMERGENCY MEDICINE

## 2022-10-09 PROCEDURE — 96372 THER/PROPH/DIAG INJ SC/IM: CPT | Performed by: INTERNAL MEDICINE

## 2022-10-09 PROCEDURE — 25000003 PHARM REV CODE 250: Performed by: INTERNAL MEDICINE

## 2022-10-09 PROCEDURE — 80053 COMPREHEN METABOLIC PANEL: CPT | Performed by: STUDENT IN AN ORGANIZED HEALTH CARE EDUCATION/TRAINING PROGRAM

## 2022-10-09 PROCEDURE — G0378 HOSPITAL OBSERVATION PER HR: HCPCS

## 2022-10-09 PROCEDURE — C9113 INJ PANTOPRAZOLE SODIUM, VIA: HCPCS | Performed by: EMERGENCY MEDICINE

## 2022-10-09 PROCEDURE — 25000003 PHARM REV CODE 250: Performed by: STUDENT IN AN ORGANIZED HEALTH CARE EDUCATION/TRAINING PROGRAM

## 2022-10-09 PROCEDURE — 36415 COLL VENOUS BLD VENIPUNCTURE: CPT | Performed by: STUDENT IN AN ORGANIZED HEALTH CARE EDUCATION/TRAINING PROGRAM

## 2022-10-09 RX ADMIN — INSULIN ASPART 5 UNITS: 100 INJECTION, SOLUTION INTRAVENOUS; SUBCUTANEOUS at 11:10

## 2022-10-09 RX ADMIN — ERYTHROMYCIN ETHYLSUCCINATE 200 MG: 200 GRANULE, FOR SUSPENSION ORAL at 04:10

## 2022-10-09 RX ADMIN — CARVEDILOL 12.5 MG: 12.5 TABLET, FILM COATED ORAL at 09:10

## 2022-10-09 RX ADMIN — CARVEDILOL 12.5 MG: 12.5 TABLET, FILM COATED ORAL at 04:10

## 2022-10-09 RX ADMIN — INSULIN ASPART 4 UNITS: 100 INJECTION, SOLUTION INTRAVENOUS; SUBCUTANEOUS at 05:10

## 2022-10-09 RX ADMIN — ONDANSETRON 4 MG: 2 INJECTION INTRAMUSCULAR; INTRAVENOUS at 12:10

## 2022-10-09 RX ADMIN — ERYTHROMYCIN ETHYLSUCCINATE 200 MG: 200 GRANULE, FOR SUSPENSION ORAL at 10:10

## 2022-10-09 RX ADMIN — ERYTHROMYCIN ETHYLSUCCINATE 200 MG: 200 GRANULE, FOR SUSPENSION ORAL at 01:10

## 2022-10-09 RX ADMIN — PANTOPRAZOLE SODIUM 40 MG: 40 INJECTION, POWDER, LYOPHILIZED, FOR SOLUTION INTRAVENOUS at 09:10

## 2022-10-09 RX ADMIN — AMLODIPINE BESYLATE 5 MG: 5 TABLET ORAL at 10:10

## 2022-10-09 RX ADMIN — PANTOPRAZOLE SODIUM 40 MG: 40 INJECTION, POWDER, LYOPHILIZED, FOR SOLUTION INTRAVENOUS at 10:10

## 2022-10-09 NOTE — NURSING
Patient family stated sugar was 100 and needed apple juice. Given 2 of apple juice., refused CBG check

## 2022-10-09 NOTE — PROGRESS NOTES
[facility] Hospital Medicine Progress Note        Chief Complaint: Inpatient follow-up on hematemesis    Subjective:  Patient is a 42-year-old white male with a past medical history of Insulin-dependent diabetes mellitus, diabetic retinopathy, diabetic gastroparesis and chronic kidney disease stage 4 who presented to the ER with 3 days of nausea and vomiting and 1 episode of bloody emesis.  He denies any prior history of GI bleeding.  He denied excessive NSAID use.  Since admission he has had no further episodes of hematemesis.  On arrival to the ER he was slightly tachycardic but hemodynamically stable and afebrile.  Laboratory work showed a hemoglobin of 14, mild acute on chronic kidney disease.  GI was consulted and recommended nothing by mouth.  Patient was very promptly admitted to the hospital medicine service.    Interval Hx:   Hypoglycemia in the a.m. blood pressure is better controlled however orthostatics were not checked so far since yesterday.  Discussed with the nurse to recheck orthostatics.    Objective/physical exam:  General:  Chronically ill-appearing white male who appears older than his stated age but is in no acute distress  HENT: normocephalic, atraumatic  Eye: PERRL, EOMI, clear conjunctiva  Neck: full ROM, no thyromegaly  Respiratory: clear to auscultation bilaterally  Cardiovascular: regular rate and rhythm  Gastrointestinal: non-distended, positive bowel sounds, non-tender  Musculoskeletal: no gross deformity  Genitourinary: Suprapubic catheter  Integumentary: warm, dry, intact, no rashes  Neurological: cranial nerves grossly intact, no focal neurological deficit  Psychiatric: cooperative, flat affect      VITAL SIGNS: 24 HRS MIN & MAX LAST   Temp  Min: 97.5 °F (36.4 °C)  Max: 98.1 °F (36.7 °C) 97.5 °F (36.4 °C)   BP  Min: 119/84  Max: 169/91 (!) 160/82     Pulse  Min: 79  Max: 96  94   Resp  Min: 18  Max: 22 18   SpO2  Min: 97 %  Max: 100 % 97 %       Recent  Labs   Lab 10/07/22  0409 10/08/22  1117 10/09/22  0335   WBC 5.4 8.1 6.5   RBC 3.46* 3.66* 3.98*   HGB 10.8* 11.3* 12.2*   HCT 32.7* 34.2* 36.8*   MCV 94.5* 93.4 92.5   MCH 31.2* 30.9 30.7   MCHC 33.0 33.0 33.2   RDW 13.6 13.3 13.2    213 180   MPV 12.2* 11.3* 11.4*         Recent Labs   Lab 10/04/22  1541 10/05/22  0515 10/06/22  0528 10/07/22  0409 10/08/22  1117 10/09/22  0335    139   < > 138 136 139   K 4.6 3.6   < > 4.3 5.0 4.4   CO2 18* 22   < > 22 21* 23   BUN 43.0* 35.3*   < > 25.7* 33.1* 34.1*   CREATININE 3.28* 2.57*   < > 2.34* 2.63* 2.55*   CALCIUM 8.7 8.3*   < > 8.1* 7.9* 8.2*   MG 2.00  --   --   --   --   --    ALBUMIN 3.0* 2.4*  --  2.3*  --  2.4*   ALKPHOS 159* 123  --  109  --  106   ALT 58* 41  --  37  --  50   AST 29 24  --  31  --  61*   BILITOT 0.2 0.2  --  0.1  --  0.3    < > = values in this interval not displayed.            Microbiology Results (last 7 days)       Procedure Component Value Units Date/Time    Urine culture [[id number]]  (Abnormal)  (Susceptibility) Collected: 10/04/22 1550    Order Status: Completed Specimen: Urine, Supra Pubic Updated: 10/08/22 1142     Urine Culture >/= 100,000 colonies/ml Pseudomonas aeruginosa             See below for Radiology    Scheduled Med:   amLODIPine  5 mg Oral Daily    carvediloL  12.5 mg Oral BID WM    docusate sodium  50 mg Oral BID    erythromycin ethylsuccinate  200 mg Oral TID WM    insulin detemir U-100  15 Units Subcutaneous Daily    lubiprostone  24 mcg Oral Daily with breakfast    pantoprazole  40 mg Intravenous BID    zolpidem  5 mg Oral QHS        Continuous Infusions:         PRN Meds:  dextrose 10%, dextrose 10%, dextrose 50%, glucagon (human recombinant), glucagon (human recombinant), haloperidol lactate, hydrALAZINE, insulin aspart U-100, insulin aspart U-100, melatonin, ondansetron, oxyCODONE-acetaminophen, oxyCODONE-acetaminophen, promethazine, sodium chloride 0.9%       Assessment/Plan:  Hematemesis likely due to  More-Walden tear   Autonomic neuropathy  Chronic constipation  Insulin-dependent diabetes mellitus  Diabetic gastroparesis  Diabetic retinopathy with blindness  Diabetic neuropathy  Stage IV chronic kidney disease  Anemia of chronic disease, the drop in hemoglobin is more likely due to dilution due to rehydration rather than actual blood loss  Essential hypertension   Neurogenic bladder with suprapubic catheter      Plan:  - Pseudomonas growing in the suprapubic catheter however he does not have signs of infection it is most likely a colonization.    -he changes his suprapubic catheter every month himself.  - will get Urology for changing suprapubic catheter.  I thing will be better change to catheter here in a sterile fashion since it is colonized.  -Nephrology is on board adjusting medications.    -Will discontinue 10 units long-acting insulin at night.  Continue sliding scale.    -Detemir 15 units daily,  -Orthostatic hypotension is chronic and it will probably not be sold in this admission he has multiple severe neuropathy due to diabetes including autonomic neuropathy.  - Will monitor more night with current blood pressure regiment and insulin regiment.  Possible DC tomorrow if improving    VTE prophylaxis:  SCDs    Patient condition:  Stable    Anticipated discharge and Disposition:     Home    All diagnosis and differential diagnosis have been reviewed; assessment and plan has been documented; I have personally reviewed the labs and test results that are presently available; I have reviewed the patients medication list; I have reviewed the consulting providers response and recommendations. I have reviewed or attempted to review medical records based upon their availability    All of the patient's questions have been  addressed and answered. Patient's is agreeable to the above stated plan. I will continue to monitor closely and make adjustments to medical management as  needed.  _____________________________________________________________________    Nutrition Status:    Radiology:  US Retroperitoneal Complete  Retroperitoneal ultrasound.     HISTORY: Chronic renal disease.     FINDINGS: Grayscale imaging and color flow Doppler images are  available for interpretation.     Examination reveals right kidney to measure 8.9 x 4.9 x 5.4 cm is of  normal size shape and contour with no abnormal masses or  hydronephrosis.     Left kidney measures 9.7 x 6.3 x 5 cm it is of normal size shape and  contour with no abnormal masses or hydronephrosis.     Bladder was decompressed by a Crandall catheter.     IMPRESSION: No significant abnormalities identified      Electronically Signed By: Piotr Pratt MD  Date/Time Signed: 09/02/2021 11:52      Mario Ellis MD   10/09/2022

## 2022-10-09 NOTE — NURSING
Patient CBG 55 given IM Glucagon. Patient very irritated and refused to have rechecked, stated he will check himself(does have Blood Glucose moniter inserted in abdomen.)

## 2022-10-10 VITALS
RESPIRATION RATE: 16 BRPM | OXYGEN SATURATION: 98 % | WEIGHT: 102 LBS | BODY MASS INDEX: 19.26 KG/M2 | DIASTOLIC BLOOD PRESSURE: 73 MMHG | HEART RATE: 89 BPM | HEIGHT: 61 IN | TEMPERATURE: 98 F | SYSTOLIC BLOOD PRESSURE: 111 MMHG

## 2022-10-10 LAB
ALBUMIN SERPL-MCNC: 2.7 GM/DL (ref 3.5–5)
ALBUMIN/GLOB SERPL: 0.9 RATIO (ref 1.1–2)
ALP SERPL-CCNC: 130 UNIT/L (ref 40–150)
ALT SERPL-CCNC: 55 UNIT/L (ref 0–55)
AST SERPL-CCNC: 36 UNIT/L (ref 5–34)
BASOPHILS # BLD AUTO: 0.07 X10(3)/MCL (ref 0–0.2)
BASOPHILS NFR BLD AUTO: 1.2 %
BILIRUBIN DIRECT+TOT PNL SERPL-MCNC: 0.3 MG/DL
BUN SERPL-MCNC: 35.7 MG/DL (ref 8.9–20.6)
CALCIUM SERPL-MCNC: 8.8 MG/DL (ref 8.4–10.2)
CHLORIDE SERPL-SCNC: 107 MMOL/L (ref 98–107)
CO2 SERPL-SCNC: 26 MMOL/L (ref 22–29)
CREAT SERPL-MCNC: 2.85 MG/DL (ref 0.73–1.18)
EOSINOPHIL # BLD AUTO: 0.1 X10(3)/MCL (ref 0–0.9)
EOSINOPHIL NFR BLD AUTO: 1.7 %
ERYTHROCYTE [DISTWIDTH] IN BLOOD BY AUTOMATED COUNT: 13.2 % (ref 11.5–17)
GFR SERPLBLD CREATININE-BSD FMLA CKD-EPI: 28 MLS/MIN/1.73/M2
GLOBULIN SER-MCNC: 2.9 GM/DL (ref 2.4–3.5)
GLUCOSE SERPL-MCNC: 242 MG/DL (ref 74–100)
HCT VFR BLD AUTO: 37.7 % (ref 42–52)
HGB BLD-MCNC: 12.5 GM/DL (ref 14–18)
IMM GRANULOCYTES # BLD AUTO: 0.03 X10(3)/MCL (ref 0–0.04)
IMM GRANULOCYTES NFR BLD AUTO: 0.5 %
LYMPHOCYTES # BLD AUTO: 0.93 X10(3)/MCL (ref 0.6–4.6)
LYMPHOCYTES NFR BLD AUTO: 16 %
MCH RBC QN AUTO: 30.8 PG (ref 27–31)
MCHC RBC AUTO-ENTMCNC: 33.2 MG/DL (ref 33–36)
MCV RBC AUTO: 92.9 FL (ref 80–94)
MONOCYTES # BLD AUTO: 0.41 X10(3)/MCL (ref 0.1–1.3)
MONOCYTES NFR BLD AUTO: 7 %
NEUTROPHILS # BLD AUTO: 4.3 X10(3)/MCL (ref 2.1–9.2)
NEUTROPHILS NFR BLD AUTO: 73.6 %
NRBC BLD AUTO-RTO: 0 %
PLATELET # BLD AUTO: 278 X10(3)/MCL (ref 130–400)
PMV BLD AUTO: 10.9 FL (ref 7.4–10.4)
POCT GLUCOSE: 101 MG/DL (ref 70–110)
POCT GLUCOSE: 116 MG/DL (ref 70–110)
POCT GLUCOSE: 220 MG/DL (ref 70–110)
POCT GLUCOSE: 435 MG/DL (ref 70–110)
POCT GLUCOSE: 79 MG/DL (ref 70–110)
POTASSIUM SERPL-SCNC: 4.6 MMOL/L (ref 3.5–5.1)
PROT SERPL-MCNC: 5.6 GM/DL (ref 6.4–8.3)
RBC # BLD AUTO: 4.06 X10(6)/MCL (ref 4.7–6.1)
SODIUM SERPL-SCNC: 137 MMOL/L (ref 136–145)
WBC # SPEC AUTO: 5.8 X10(3)/MCL (ref 4.5–11.5)

## 2022-10-10 PROCEDURE — 25000003 PHARM REV CODE 250: Performed by: NURSE PRACTITIONER

## 2022-10-10 PROCEDURE — 63600175 PHARM REV CODE 636 W HCPCS: Performed by: INTERNAL MEDICINE

## 2022-10-10 PROCEDURE — 63600175 PHARM REV CODE 636 W HCPCS: Performed by: STUDENT IN AN ORGANIZED HEALTH CARE EDUCATION/TRAINING PROGRAM

## 2022-10-10 PROCEDURE — 96376 TX/PRO/DX INJ SAME DRUG ADON: CPT

## 2022-10-10 PROCEDURE — G0378 HOSPITAL OBSERVATION PER HR: HCPCS

## 2022-10-10 PROCEDURE — 96372 THER/PROPH/DIAG INJ SC/IM: CPT | Performed by: STUDENT IN AN ORGANIZED HEALTH CARE EDUCATION/TRAINING PROGRAM

## 2022-10-10 PROCEDURE — 96372 THER/PROPH/DIAG INJ SC/IM: CPT | Performed by: INTERNAL MEDICINE

## 2022-10-10 PROCEDURE — 99214 PR OFFICE/OUTPT VISIT, EST, LEVL IV, 30-39 MIN: ICD-10-PCS | Mod: ,,, | Performed by: INTERNAL MEDICINE

## 2022-10-10 PROCEDURE — 36415 COLL VENOUS BLD VENIPUNCTURE: CPT | Performed by: STUDENT IN AN ORGANIZED HEALTH CARE EDUCATION/TRAINING PROGRAM

## 2022-10-10 PROCEDURE — 25000003 PHARM REV CODE 250: Performed by: INTERNAL MEDICINE

## 2022-10-10 PROCEDURE — 85025 COMPLETE CBC W/AUTO DIFF WBC: CPT | Performed by: STUDENT IN AN ORGANIZED HEALTH CARE EDUCATION/TRAINING PROGRAM

## 2022-10-10 PROCEDURE — 80053 COMPREHEN METABOLIC PANEL: CPT | Performed by: STUDENT IN AN ORGANIZED HEALTH CARE EDUCATION/TRAINING PROGRAM

## 2022-10-10 PROCEDURE — 99214 OFFICE O/P EST MOD 30 MIN: CPT | Mod: ,,, | Performed by: INTERNAL MEDICINE

## 2022-10-10 PROCEDURE — 25000003 PHARM REV CODE 250: Performed by: STUDENT IN AN ORGANIZED HEALTH CARE EDUCATION/TRAINING PROGRAM

## 2022-10-10 PROCEDURE — 63600175 PHARM REV CODE 636 W HCPCS: Performed by: EMERGENCY MEDICINE

## 2022-10-10 PROCEDURE — C9113 INJ PANTOPRAZOLE SODIUM, VIA: HCPCS | Performed by: EMERGENCY MEDICINE

## 2022-10-10 RX ORDER — AMLODIPINE BESYLATE 5 MG/1
5 TABLET ORAL DAILY
Qty: 30 TABLET | Refills: 11 | Status: SHIPPED | OUTPATIENT
Start: 2022-10-11 | End: 2023-10-11

## 2022-10-10 RX ORDER — OXYCODONE AND ACETAMINOPHEN 5; 325 MG/1; MG/1
1 TABLET ORAL EVERY 8 HOURS PRN
Qty: 15 TABLET | Refills: 0 | Status: SHIPPED | OUTPATIENT
Start: 2022-10-10 | End: 2022-10-17

## 2022-10-10 RX ORDER — CARVEDILOL 12.5 MG/1
12.5 TABLET ORAL 2 TIMES DAILY WITH MEALS
Qty: 60 TABLET | Refills: 11 | Status: SHIPPED | OUTPATIENT
Start: 2022-10-10 | End: 2023-10-10

## 2022-10-10 RX ADMIN — LUBIPROSTONE 24 MCG: 24 CAPSULE, GELATIN COATED ORAL at 11:10

## 2022-10-10 RX ADMIN — PANTOPRAZOLE SODIUM 40 MG: 40 INJECTION, POWDER, LYOPHILIZED, FOR SOLUTION INTRAVENOUS at 09:10

## 2022-10-10 RX ADMIN — ERYTHROMYCIN ETHYLSUCCINATE 200 MG: 200 GRANULE, FOR SUSPENSION ORAL at 11:10

## 2022-10-10 RX ADMIN — INSULIN ASPART 5 UNITS: 100 INJECTION, SOLUTION INTRAVENOUS; SUBCUTANEOUS at 06:10

## 2022-10-10 RX ADMIN — CARVEDILOL 12.5 MG: 12.5 TABLET, FILM COATED ORAL at 09:10

## 2022-10-10 RX ADMIN — INSULIN ASPART 2 UNITS: 100 INJECTION, SOLUTION INTRAVENOUS; SUBCUTANEOUS at 01:10

## 2022-10-10 RX ADMIN — DOCUSATE SODIUM 50 MG: 50 CAPSULE, LIQUID FILLED ORAL at 09:10

## 2022-10-10 RX ADMIN — ONDANSETRON 4 MG: 2 INJECTION INTRAMUSCULAR; INTRAVENOUS at 03:10

## 2022-10-10 RX ADMIN — AMLODIPINE BESYLATE 5 MG: 5 TABLET ORAL at 09:10

## 2022-10-10 RX ADMIN — INSULIN DETEMIR 15 UNITS: 100 INJECTION, SOLUTION SUBCUTANEOUS at 09:10

## 2022-10-10 RX ADMIN — INSULIN ASPART 1 UNITS: 100 INJECTION, SOLUTION INTRAVENOUS; SUBCUTANEOUS at 11:10

## 2022-10-10 NOTE — PROGRESS NOTES
Inpatient Nutrition Assessment    Admit Date: 10/4/2022   Total duration of encounter: 6 days     Nutrition Recommendation/Prescription     Continue diabetic diet.   Discontinue Boost Glucose Control.   Medical management of CBGs.     Communication of Recommendations: reviewed with patient/caregiver    Nutrition Assessment     Malnutrition Assessment/Nutrition-Focused Physical Exam    Malnutrition in the context of acute illness or injury  Degree of Malnutrition: non-severe (moderate) malnutrition  Energy Intake: </= 50% of estimated energy requirement for >/= 5 days  Interpretation of Weight Loss: >2% in 1 week  Body Fat:mild depletion  Area of Body Fat Loss: upper arm region - triceps / biceps  Muscle Mass Loss: mild depletion  Area of Muscle Mass Loss: temple region - temporalis muscle and clavicle bone region - pectoralis major, deltoid, trapezius muscles  Fluid Accumulation: does not meet criteria  Edema: does not meet criteria   Reduced  Strength: unable to obtain  A minimum of two characteristics is recommended for diagnosis of either severe or non-severe malnutrition.    Chart Review    Reason Seen: continuous nutrition monitoring and follow-up    Diagnosis:  Hematemesis, resolved  Gastroparesis  Chronic constipation   Insulin-dependent diabetes mellitus    Relevant Medical History: Insulin-dependent diabetes mellitus, anemia of chronic disease, diabetic retinopathy, gastroparesis and chronic kidney disease stage 4    Nutrition-Related Medications: docusate sodium, 15units detemir, pantoprazole. PRN: glucagon, SSI, ondansetron, promethazine  Calorie Containing IV Medications: no significant kcals from medications at this time    Nutrition-Related Labs:  10/6/22 Cl 113, CO2 20, BUN 25.8, Crea 2.73, GFR 29, Gluc 326 (CBGs ), Ca 8.1  10/10/22 BUN 35.7, Crea 2.85, GFR 28, Gluc 242 (CBGs 220-448), total pro 5.6, Alb 2.7, AST 36    Diet/PN Order: Diet diabetic  Oral Supplement Order: none at this  "time  Tube Feeding Order: none at this time  Appetite/Oral Intake: fair/50-75% of meals  Factors Affecting Nutritional Intake: altered gastrointestinal function, nausea, and vomiting  Food/Yazdanism/Cultural Preferences: none reported    Skin Integrity: intact  Wound(s):   none documented    Comments    10/6/22 Pt tolerated liquids yesterday and today for breakfast. Mom reports frequent vomiting with gastroparesis but worsened the last few days. No further vomiting but reports constipation. With 8lb (7%) unintentional wt loss within the last few weeks. Willing to trial strawberry oral supplement.     10/10/22 Improved intake of meals. Denies any GI complaints at this time. Not drinking boost, will discontinue.     Anthropometrics    Height: 5' 1" (154.9 cm) Height Method: Stated  Last Weight: 46.3 kg (102 lb) (10/05/22 1030) Weight Method: Standard Scale  BMI (Calculated): 19.3  BMI Classification: normal (BMI 18.5-24.9)        Ideal Body Weight (IBW), Male: 112 lb     % Ideal Body Weight, Male (lb): 91.07 %                 Usual Body Weight (UBW), k kg  % Usual Body Weight: 92.73     Usual Weight Provided By: patient    Wt Readings from Last 5 Encounters:   10/05/22 46.3 kg (102 lb)   22 45.2 kg (99 lb 10.4 oz)   22 46.3 kg (102 lb 1.2 oz)   22 48.5 kg (106 lb 14.8 oz)   21 47.8 kg (105 lb 6.1 oz)     Weight Change(s) Since Admission:  Admit Weight: 45.1 kg (99 lb 8 oz) (10/04/22 1519)  10/6/22 45.1kg admit wt  10/10/22 no updated wts     Estimated Needs    Weight Used For Calorie Calculations: 46.3 kg (102 lb 1.2 oz)  Energy Calorie Requirements (kcal): 1620-1850kcals/d (35-40kcals/kg)  Energy Need Method: Kcal/kg  Weight Used For Protein Calculations: 46.3 kg (102 lb 1.2 oz)  Protein Requirements: 51-60g/d (1.1-1.3g/kg, CKD)  Fluid Requirements (mL): 1620-1850ml fl/d (35-40ml fl/kg)  Temp: 98.4 °F (36.9 °C)       Enteral Nutrition    Patient not receiving enteral nutrition at this " time.    Parenteral Nutrition    Patient not receiving parenteral nutrition support at this time.    Evaluation of Received Nutrient Intake    Calories: not meeting estimated needs  Protein: not meeting estimated needs    Patient Education    Not applicable.    Nutrition Diagnosis     PES: Malnutrition related to gastroparesis as evidenced by <50% nutritional needs x5 days, >2% wt loss x1wk, muscle and fat loss. (improving)    Interventions/Goals     Intervention(s): prescription medication and collaboration with other providers  Goal: Meet greater than 75% of nutritional needs by follow-up. (goal progressing)    Monitoring & Evaluation     Dietitian will monitor energy intake, weight change, electrolyte/renal panel, and glucose/endocrine profile.  Nutrition Risk/Follow-Up: high (follow-up in 1-4 days)

## 2022-10-10 NOTE — PT/OT/SLP PROGRESS
Physical Therapy      Patient Name:  Randall Hernandez   MRN:  [id number]    Tx attempted however pt's mother speaking with RN and wanting to leave today. MD paged to assist in pt's needs and attempt to avoid pt leaving AMA. Will follow up later if schedule allows.

## 2022-10-10 NOTE — PROCEDURES
"Randall Hernandez is a 42 y.o. male patient.    Temp: 98.4 °F (36.9 °C) (10/10/22 0708)  Pulse: 97 (10/10/22 1108)  Resp: 19 (10/10/22 0402)  BP: (!) 154/89 (10/10/22 1108)  SpO2: 99 % (10/10/22 0708)  Weight: 46.3 kg (102 lb) (10/05/22 1030)  Height: 5' 1" (154.9 cm) (10/05/22 1030)       Bladder Cath    Date/Time: 10/10/2022 12:04 PM  Location procedure was performed: [facility]  Performed by: BRITTNEY Harrington  Authorized by: BRITTNEY Harrington   Indications: catheter change  Local anesthesia used: no    Anesthesia:  Local anesthesia used: no    Patient sedated: no  Preparation: Patient was prepped and draped in the usual sterile fashion.  Catheter insertion: indwelling  Catheter type: Crandall  Catheter size: 16 Fr  Complicated insertion: no  Altered anatomy: no  Bladder irrigation: no  Number of attempts: 1  Urine characteristics: cloudy  Complications: No  Estimated blood loss (mL): 0  Specimens: No  Implants: No  Patient tolerance: Patient tolerated the procedure well with no immediate complications        10/10/2022    "

## 2022-10-10 NOTE — PROGRESS NOTES
[facility] Inpatient Progress Note      HPI:     Patient Name: Randall Hernandez  Admission Date: 10/4/2022  Hospital Length of Stay: 1 days  Code Status: Full Code   Attending Physician: Itz Kevin MD   Primary Care Physician: Juvenal Otto MD  Principal Problem:Hematemesis with nausea      Today pt seen and examined  Labs, events, imaging and meds reviewed for this hospital stay  Labile sugar control  Orthostatics note done      Review of Systems:   No hemoptysis  Still with nausea  Labile BP    Medications:   Scheduled Meds:   amLODIPine  5 mg Oral Daily    carvediloL  12.5 mg Oral BID WM    docusate sodium  50 mg Oral BID    erythromycin ethylsuccinate  200 mg Oral TID WM    insulin detemir U-100  15 Units Subcutaneous Daily    lubiprostone  24 mcg Oral Daily with breakfast    pantoprazole  40 mg Intravenous BID    zolpidem  5 mg Oral QHS     Continuous Infusions:      Vitals:     Vitals:    10/09/22 1958 10/09/22 2348 10/10/22 0402 10/10/22 0708   BP: 123/68 124/78 (!) 151/82 (!) 145/75   Pulse: 84 90 86 95   Resp: 18 18 19    Temp: 98.1 °F (36.7 °C)  97.8 °F (36.6 °C) 98.4 °F (36.9 °C)   TempSrc: Oral  Oral Oral   SpO2: 98% 97% 98% 99%   Weight:       Height:             I/O last 3 completed shifts:  In: 180 [P.O.:180]  Out: 2000 [Urine:2000]    Intake/Output Summary (Last 24 hours) at 10/10/2022 0915  Last data filed at 10/10/2022 0557  Gross per 24 hour   Intake 180 ml   Output 1500 ml   Net -1320 ml       Physical Exam:   General: no acute distress,  Eyes: peripheral blindness, pupils react  HENT: atraumatic, oropharynx and nasal mucosa patent  Neck: full ROM, no JVD, no thyromegaly or lymphadenopathy  Respiratory: equal, unlabored, wheezing at bases  Cardiovascular: RRR without  rub; BL radial and pedal pulses weak  Edema: none  Gastrointestinal: soft, non-tender, non-distended; positive bowel sounds; no masses to palpation  Genitourinary: suprapubic catheter  Musculoskeletal: full ROM  without limitation or discomfort  Integumentary: warm, dry; no rashes, wounds, or skin lesions  Neurological: blind, decrease peripheral sensation  Dialysis access:  L AVF      Labs:     Cardiac Enzymes: Ejection Fractions:    No results for input(s): CPK, CPKMB, MB, TROPONINI in the last 72 hours. No results found for: EF     Chemistries:   Recent Labs   Lab 10/04/22  1541 10/05/22  0515 10/06/22  0528 10/07/22  0409 10/08/22  1117 10/09/22  0335    139   < > 138 136 139   K 4.6 3.6   < > 4.3 5.0 4.4   CO2 18* 22   < > 22 21* 23   BUN 43.0* 35.3*   < > 25.7* 33.1* 34.1*   CREATININE 3.28* 2.57*   < > 2.34* 2.63* 2.55*   CALCIUM 8.7 8.3*   < > 8.1* 7.9* 8.2*   BILITOT 0.2 0.2  --  0.1  --  0.3   ALKPHOS 159* 123  --  109  --  106   ALT 58* 41  --  37  --  50   AST 29 24  --  31  --  61*   GLUCOSE 316* 86   < > 150* 341* 93   MG 2.00  --   --   --   --   --     < > = values in this interval not displayed.        CBC/Anemia Labs: Coags:    Recent Labs   Lab 10/07/22  0409 10/08/22  1117 10/09/22  0335   WBC 5.4 8.1 6.5   HGB 10.8* 11.3* 12.2*   HCT 32.7* 34.2* 36.8*    213 180   MCV 94.5* 93.4 92.5   RDW 13.6 13.3 13.2    Recent Labs   Lab 10/04/22  1541   INR 0.97        POCT Glucose: HbA1c:    Recent Labs   Lab 10/08/22  2035 10/09/22  0519 10/09/22  1140 10/09/22  1625 10/09/22  2234 10/10/22  0533   POCTGLUCOSE 55* 230* 448* 323* 373* 435*    Hemoglobin A1C   Date Value Ref Range Status   10/14/2021 11.1 (H) <=6.5 % Final          Impression:       Patient Active Problem List   Diagnosis    Hematemesis with nausea     Severe diabetic nephropathy, CKD3-4  Diabetic retinopathy  Diabetic gastropathy  Diabetic neuropathy  Autonomic dysfunction     Plan:   Renalwise stable  Check orthostatics  Will Fu as outpt         Patric Watkins

## 2022-10-10 NOTE — NURSING
Patient received all instructions regarding follow up appointments, medications, and discharge instructions. Patient leaving with chronic suprapubic catheter in place. No complaints of pain at this time. Patient is stable. Iv and telemetry d/c'd. Patient is being discharged home with mother.

## 2022-10-10 NOTE — CONSULTS
Randall Hernandez 1980  [id number]  10/10/2022    CONSULTING PHYSICIAN: Rhonda    Reason for consult:  Suprapubic catheter exchange    HPI: Mr. Hernandez is a 42-year-old male past medical history listed below.  Presented to the ER with complaints of 3 days of nausea and vomiting with 1 episode described as bloody.  Since admission he has had no further episodes of hematemesis.  He was admitted for further evaluation.  We were consulted to exchange his suprapubic catheter. He normally exchanges his SP tube on the first of every month however, attending wanted to assure it is being exchanged in a sterile fashion prompting the consult. He does have colonization of Pseudomonas. He primary Urologist id Dr. Hernandez.       Past Medical History:   Diagnosis Date    Anemia in CKD (chronic kidney disease)     Back pain     Blind     DM (diabetes mellitus)     Gastroparesis     HTN (hypertension)     Neuropathy     Obesity     Suprapubic catheter     Urine retention        Past Surgical History:   Procedure Laterality Date    AV FISTULA PLACEMENT      CHOLECYSTECTOMY      DEBRIDEMENT      ESOPHAGOGASTRODUODENOSCOPY N/A 10/5/2022    Procedure: EGD;  Surgeon: Colby Meier MD;  [facility] ENDOSCOPY;  Service: Gastroenterology;  Laterality: N/A;    INSERTION OF SUPRAPUBIC CATHETER         Family History   Problem Relation Age of Onset    Arrhythmia Mother     Hypertension Mother     Arrhythmia Father        Social History     Tobacco Use    Smoking status: Every Day    Smokeless tobacco: Never   Substance Use Topics    Alcohol use: Never    Drug use: Never       Current Facility-Administered Medications   Medication Dose Route Frequency Provider Last Rate Last Admin    amLODIPine tablet 5 mg  5 mg Oral Daily Mario Ellis MD   5 mg at 10/09/22 1003    carvediloL tablet 12.5 mg  12.5 mg Oral BID  Patric Watkins MD   12.5 mg at 10/09/22 1625    dextrose 10% bolus 125 mL  12.5 g Intravenous PRN Kemar Lamb MD         dextrose 10% bolus 250 mL  25 g Intravenous PRN Kemar Lamb MD        dextrose 50% injection 12.5 g  12.5 g Intravenous PRN Kemar Lamb MD        docusate sodium capsule 50 mg  50 mg Oral BID LETI Fuentes   50 mg at 10/08/22 1000    erythromycin ethylsuccinate 200 mg/5 mL suspension 200 mg  200 mg Oral TID  Colby Meier MD   200 mg at 10/09/22 1625    glucagon (human recombinant) injection 1 mg  1 mg Intramuscular PRN Kemar Lamb MD   1 mg at 10/08/22 2041    glucagon (human recombinant) injection 1 mg  1 mg Intramuscular PRN Kemar Lamb MD        haloperidol lactate injection 2 mg  2 mg Intramuscular Q4H PRN Kemar Lamb MD        hydrALAZINE injection 10 mg  10 mg Intravenous Q4H PRN Mario Ellis MD        insulin aspart U-100 injection 1-10 Units  1-10 Units Subcutaneous Q6H PRN Kemar Lamb MD   5 Units at 10/10/22 0605    insulin aspart U-100 injection 1-10 Units  1-10 Units Subcutaneous Q6H PRN Kemar Lamb MD   2 Units at 10/10/22 0149    insulin detemir U-100 injection 15 Units  15 Units Subcutaneous Daily Mario Ellis MD        lubiprostone capsule 24 mcg  24 mcg Oral Daily with breakfast LETI Fuentes   24 mcg at 10/08/22 1001    melatonin tablet 6 mg  6 mg Oral Nightly PRN Kemar Lamb MD        ondansetron injection 4 mg  4 mg Intravenous Q6H PRN Kemar Lamb MD   4 mg at 10/09/22 1208    oxyCODONE-acetaminophen  mg per tablet 1 tablet  1 tablet Oral Q4H PRN Itz Kevin MD   1 tablet at 10/07/22 2015    oxyCODONE-acetaminophen 5-325 mg per tablet 1 tablet  1 tablet Oral Q4H PRN Itz Kevin MD   1 tablet at 10/08/22 1000    pantoprazole injection 40 mg  40 mg Intravenous BID Corrine Balbuena MD   40 mg at 10/09/22 2221    promethazine injection 25 mg  25 mg Intramuscular Q6H PRN Kemar Lamb MD   25 mg at 10/07/22 1957    sodium chloride 0.9% flush 10 mL  10 mL  Intravenous PRN Kemar Lamb MD        zolpidem tablet 5 mg  5 mg Oral QHS Itz Kevin MD   5 mg at 10/08/22 2041       Review of patient's allergies indicates:   Allergen Reactions    Ivp dye [iodinated contrast media]     Metoclopramide      Other reaction(s): weakness    Ceftriaxone Rash     Other reaction(s): Other (See Comments)  Joint pain, chest tightness, numbness: hands, feet, throat, blurred vision, lightheaded   Other reaction(s): Other (See Comments)  Joint pain, chest tightness, numbness: hands, feet, throat, blurred vision, lightheaded   Joint pain, chest tightness, numbness: hands, feet, throat, blurred vision, lightheaded   Other reaction(s): Other (See Comments)  Joint pain, chest tightness, numbness: hands, feet, throat, blurred vision, lightheaded          ROS: 12 point review of systems negative other than the HPI      PHYSICAL EXAM:  Vitals:    10/09/22 2348 10/10/22 0402 10/10/22 0708 10/10/22 0934   BP: 124/78 (!) 151/82 (!) 145/75 (!) (P) 145/75   Pulse: 90 86 95    Resp: 18 19     Temp:  97.8 °F (36.6 °C) 98.4 °F (36.9 °C)    TempSrc:  Oral Oral    SpO2: 97% 98% 99%    Weight:       Height:             Intake/Output Summary (Last 24 hours) at 10/10/2022 1005  Last data filed at 10/10/2022 0557  Gross per 24 hour   Intake 180 ml   Output 1500 ml   Net -1320 ml       GEN: WN/WD NAD  HEENT: NCAT, PERRLA, EOMI, OP clear, nares patent  CV: RRR  RESP: Even and unlabored  ABD: lower abdomen/suprapubic tenderness  : yellow urine with some cloudy sediment draining to  batg, suprapubic site intact without redness  EXT: no C/C/E  NEURO: no focal deficits, MAEW, AAOx4      LABS:  Recent Results (from the past 24 hour(s))   POCT glucose    Collection Time: 10/09/22 11:40 AM   Result Value Ref Range    POCT Glucose 448 (H) 70 - 110 mg/dL   POCT glucose    Collection Time: 10/09/22  4:25 PM   Result Value Ref Range    POCT Glucose 323 (H) 70 - 110 mg/dL   POCT glucose    Collection  Time: 10/09/22 10:34 PM   Result Value Ref Range    POCT Glucose 373 (H) 70 - 110 mg/dL   POCT glucose    Collection Time: 10/10/22  5:33 AM   Result Value Ref Range    POCT Glucose 435 (H) 70 - 110 mg/dL       IMAGING: None      ASSESSMENT:  Hematemesis  Diabetic nephropathy; CKD 3-4   Neurogenic bladder with chronic SP tube  Diabetic retinopathy, gastropathy and neuropathy  Autonomic dysfunction      PLAN:  Continue having SP tube exchanged every month, can also flush Q 24 hours with 10 mL's NS. Discussed with patient, family member and nursing staff.    BRITTNEY Harrington      Speeg:  The patient was seen and examined and his SP tube was exchanged.  I have reviewed Sanna's note and I agree with her assessment and plan.  Suprapubic tube change instructions were given to the patient.  Please call with additional questions or concerns

## 2022-11-02 NOTE — DISCHARGE SUMMARY
[facility]  Hospital Medicine Discharge Summary    Admit Date: 10/4/2022  Discharge Date and Time: 10/10/2022  Discharging Physician: Mario Ellis MD.  Consults: Nephrology and Urology    Discharge Diagnoses:  Hematemesis likely due to More-Walden tear   Autonomic neuropathy  Chronic constipation  Insulin-dependent diabetes mellitus  Diabetic gastroparesis  Diabetic retinopathy with blindness  Diabetic neuropathy  Stage IV chronic kidney disease  Anemia of chronic disease, the drop in hemoglobin is more likely due to dilution due to rehydration rather than actual blood loss  Essential hypertension   Neurogenic bladder with suprapubic catheter    Hospital Course:   Patient is a 42-year-old white male with a past medical history of Insulin-dependent diabetes mellitus, diabetic retinopathy, diabetic gastroparesis and chronic kidney disease stage 4 who presented to the ER with 3 days of nausea and vomiting and 1 episode of bloody emesis.  He denies any prior history of GI bleeding.  He denied excessive NSAID use.  Since admission he has had no further episodes of hematemesis.  On arrival to the ER he was slightly tachycardic but hemodynamically stable and afebrile.  Laboratory work showed a hemoglobin of 14, mild acute on chronic kidney disease.  GI was consulted and recommended nothing by mouth.  Patient was very promptly admitted to the hospital medicine service.     - Pseudomonas growing in the suprapubic catheter however he does not have signs of infection it is most likely a colonization.    -he changes his suprapubic catheter every month himself.  - Urology was called and exchanged the cath.   -Nephrology is on board adjusting medications.    -Will discontinue 10 units long-acting insulin at night.  Continue sliding scale.    -Detemir 15 units daily,  -Orthostatic hypotension is chronic and it will probably not be sold in this admission he has multiple severe neuropathy due to  diabetes including autonomic neuropathy.  His issues are chronic and will need follow-up with multiple specialties. Recommended seeing endocrinologist.   Possible DC tomorrow if improving        Objective/physical exam:  General:  Chronically ill-appearing white male who appears older than his stated age but is in no acute distress  HENT: normocephalic, atraumatic  Eye: PERRL, EOMI, clear conjunctiva  Neck: full ROM, no thyromegaly  Respiratory: clear to auscultation bilaterally  Cardiovascular: regular rate and rhythm  Gastrointestinal: non-distended, positive bowel sounds, non-tender  Musculoskeletal: no gross deformity  Genitourinary: Suprapubic catheter  Integumentary: warm, dry, intact, no rashes  Neurological: cranial nerves grossly intact, no focal neurological deficit  Psychiatric: cooperative, flat affect    Vitals:  VITAL SIGNS: 24 HRS MIN & MAX LAST   No data recorded 98.1 °F (36.7 °C)   No data recorded 111/73   No data recorded  89   No data recorded 16   No data recorded 98 %         Procedures Performed: No admission procedures for hospital encounter.     Significant Diagnostic Studies: See Full reports for all details    No results for input(s): WBC, RBC, HGB, HCT, MCV, MCH, MCHC, RDW, PLT, MPV, GRAN, LYMPH, MONO, BASO, NRBC in the last 168 hours.    No results for input(s): NA, K, CL, CO2, ANIONGAP, BUN, CREATININE, GLU, CALCIUM, PH, MG, ALBUMIN, PROT, ALKPHOS, ALT, AST, BILITOT in the last 168 hours.     Microbiology Results (last 7 days)       Procedure Component Value Units Date/Time    Urine culture [[id number]]  (Abnormal)  (Susceptibility) Collected: 10/04/22 1550    Order Status: Completed Specimen: Urine, Supra Pubic Updated: 10/08/22 1142     Urine Culture >/= 100,000 colonies/ml Pseudomonas aeruginosa             US Retroperitoneal Complete  Retroperitoneal ultrasound.     HISTORY: Chronic renal disease.     FINDINGS: Grayscale imaging and color flow Doppler images are  available for  interpretation.     Examination reveals right kidney to measure 8.9 x 4.9 x 5.4 cm is of  normal size shape and contour with no abnormal masses or  hydronephrosis.     Left kidney measures 9.7 x 6.3 x 5 cm it is of normal size shape and  contour with no abnormal masses or hydronephrosis.     Bladder was decompressed by a Crandall catheter.     IMPRESSION: No significant abnormalities identified      Electronically Signed By: Piotr Pratt MD  Date/Time Signed: 09/02/2021 11:52         Medication List        START taking these medications      amLODIPine 5 MG tablet  Commonly known as: NORVASC  Take 1 tablet (5 mg total) by mouth once daily.            CHANGE how you take these medications      carvediloL 12.5 MG tablet  Commonly known as: COREG  Take 1 tablet (12.5 mg total) by mouth 2 (two) times daily with meals.  What changed:   medication strength  how much to take            CONTINUE taking these medications      ascorbic acid (vitamin C) 1000 MG tablet  Commonly known as: VITAMIN C     diphenoxylate-atropine 2.5-0.025 mg 2.5-0.025 mg per tablet  Commonly known as: LOMOTIL     famotidine 20 MG tablet  Commonly known as: PEPCID     gabapentin 400 MG capsule  Commonly known as: NEURONTIN     insulin glargine 100 units/mL SubQ pen     nitrofurantoin 100 MG capsule  Commonly known as: MACRODANTIN     NovoLOG Flexpen U-100 Insulin 100 unit/mL (3 mL) Inpn pen  Generic drug: insulin aspart U-100     promethazine 25 MG tablet  Commonly known as: PHENERGAN     traZODone 100 MG tablet  Commonly known as: DESYREL            STOP taking these medications      doxycycline 100 MG Cap  Commonly known as: VIBRAMYCIN     HYDROcodone-acetaminophen  mg per tablet  Commonly known as: NORCO            ASK your doctor about these medications      oxyCODONE-acetaminophen 5-325 mg per tablet  Commonly known as: PERCOCET  Take 1 tablet by mouth every 8 (eight) hours as needed for Pain (severe pain).  Ask about: Should I take  this medication?               Where to Get Your Medications        These medications were sent to [facility] - [place], [place] - [address]  [address] [place] [address]      Phone: [phone]   amLODIPine 5 MG tablet  carvediloL 12.5 MG tablet  oxyCODONE-acetaminophen 5-325 mg per tablet          Explained in detail to the patient about the discharge plan, medications, and follow-up visits. Pt understands and agrees with the treatment plan  Discharge Disposition: Home-Health Care [facility] [place]   Discharged Condition: stable  Diet-    Medications Per DC med rec  Activities as tolerated   Follow-up Information       Juvenal Otto MD Follow up in 1 week(s).    Specialty: Internal Medicine  Why: Hospital followup  Contact information:  [address]  [place] [address] [phone]               Patric Watkins MD. Schedule an appointment as soon as possible for a visit.    Specialty: Nephrology  Contact information:  [address] [place] [id number] [phone]                           For further questions contact hospitalist office    Discharge time 33 minutes    For worsening symptoms, chest pain, shortness of breath, increased abdominal pain, high grade fever, stroke or stroke like symptoms, immediately go to the nearest Emergency Room or call 911 as soon as possible.      Mario Young M.D, on 11/2/2022. at 12:50 PM.

## 2022-12-02 ENCOUNTER — TELEPHONE (OUTPATIENT)
Dept: NEPHROLOGY | Facility: CLINIC | Age: 42
End: 2022-12-02
Payer: MEDICARE

## 2022-12-02 NOTE — TELEPHONE ENCOUNTER
Pt's mother confirm Mondays appt. Advised pt's mother to bring a list of the pt's medications. Pt's mother voiced understanding.

## 2022-12-05 ENCOUNTER — OFFICE VISIT (OUTPATIENT)
Dept: NEPHROLOGY | Facility: CLINIC | Age: 42
End: 2022-12-05
Payer: MEDICARE

## 2022-12-05 VITALS
SYSTOLIC BLOOD PRESSURE: 118 MMHG | WEIGHT: 102.75 LBS | RESPIRATION RATE: 20 BRPM | OXYGEN SATURATION: 99 % | BODY MASS INDEX: 19.4 KG/M2 | TEMPERATURE: 97 F | HEIGHT: 61 IN | DIASTOLIC BLOOD PRESSURE: 72 MMHG | HEART RATE: 83 BPM

## 2022-12-05 DIAGNOSIS — R80.9 PROTEINURIA, UNSPECIFIED TYPE: ICD-10-CM

## 2022-12-05 DIAGNOSIS — E11.8 DM (DIABETES MELLITUS) WITH COMPLICATIONS: ICD-10-CM

## 2022-12-05 DIAGNOSIS — N18.4 CKD (CHRONIC KIDNEY DISEASE) STAGE 4, GFR 15-29 ML/MIN: Primary | ICD-10-CM

## 2022-12-05 DIAGNOSIS — I10 HYPERTENSION, UNSPECIFIED TYPE: ICD-10-CM

## 2022-12-05 DIAGNOSIS — E87.5 HYPERKALEMIA: ICD-10-CM

## 2022-12-05 PROCEDURE — 99999 PR PBB SHADOW E&M-EST. PATIENT-LVL IV: CPT | Mod: PBBFAC,,, | Performed by: INTERNAL MEDICINE

## 2022-12-05 PROCEDURE — 1159F PR MEDICATION LIST DOCUMENTED IN MEDICAL RECORD: ICD-10-PCS | Mod: CPTII,S$GLB,, | Performed by: INTERNAL MEDICINE

## 2022-12-05 PROCEDURE — 3008F BODY MASS INDEX DOCD: CPT | Mod: CPTII,S$GLB,, | Performed by: INTERNAL MEDICINE

## 2022-12-05 PROCEDURE — 3078F PR MOST RECENT DIASTOLIC BLOOD PRESSURE < 80 MM HG: ICD-10-PCS | Mod: CPTII,S$GLB,, | Performed by: INTERNAL MEDICINE

## 2022-12-05 PROCEDURE — 3008F PR BODY MASS INDEX (BMI) DOCUMENTED: ICD-10-PCS | Mod: CPTII,S$GLB,, | Performed by: INTERNAL MEDICINE

## 2022-12-05 PROCEDURE — 3078F DIAST BP <80 MM HG: CPT | Mod: CPTII,S$GLB,, | Performed by: INTERNAL MEDICINE

## 2022-12-05 PROCEDURE — 1159F MED LIST DOCD IN RCRD: CPT | Mod: CPTII,S$GLB,, | Performed by: INTERNAL MEDICINE

## 2022-12-05 PROCEDURE — 99214 OFFICE O/P EST MOD 30 MIN: CPT | Mod: S$GLB,,, | Performed by: INTERNAL MEDICINE

## 2022-12-05 PROCEDURE — 3066F NEPHROPATHY DOC TX: CPT | Mod: CPTII,S$GLB,, | Performed by: INTERNAL MEDICINE

## 2022-12-05 PROCEDURE — 3074F PR MOST RECENT SYSTOLIC BLOOD PRESSURE < 130 MM HG: ICD-10-PCS | Mod: CPTII,S$GLB,, | Performed by: INTERNAL MEDICINE

## 2022-12-05 PROCEDURE — 3066F PR DOCUMENTATION OF TREATMENT FOR NEPHROPATHY: ICD-10-PCS | Mod: CPTII,S$GLB,, | Performed by: INTERNAL MEDICINE

## 2022-12-05 PROCEDURE — 99214 PR OFFICE/OUTPT VISIT, EST, LEVL IV, 30-39 MIN: ICD-10-PCS | Mod: S$GLB,,, | Performed by: INTERNAL MEDICINE

## 2022-12-05 PROCEDURE — 3074F SYST BP LT 130 MM HG: CPT | Mod: CPTII,S$GLB,, | Performed by: INTERNAL MEDICINE

## 2022-12-05 PROCEDURE — 99999 PR PBB SHADOW E&M-EST. PATIENT-LVL IV: ICD-10-PCS | Mod: PBBFAC,,, | Performed by: INTERNAL MEDICINE

## 2022-12-05 RX ORDER — HYDROCODONE BITARTRATE AND ACETAMINOPHEN 10; 325 MG/1; MG/1
1 TABLET ORAL EVERY 6 HOURS PRN
COMMUNITY
Start: 2022-11-30

## 2022-12-05 RX ORDER — SENNOSIDES 8.6 MG/1
17.2 TABLET ORAL
COMMUNITY

## 2022-12-05 RX ORDER — ESCITALOPRAM OXALATE 20 MG/1
20 TABLET ORAL DAILY
COMMUNITY
Start: 2022-11-30

## 2022-12-05 RX ORDER — CALCITRIOL 0.25 UG/1
0.25 CAPSULE ORAL DAILY
Qty: 90 CAPSULE | Refills: 3 | Status: SHIPPED | OUTPATIENT
Start: 2022-12-05 | End: 2023-12-05

## 2022-12-05 NOTE — PROGRESS NOTES
[facility] Progress Note      HPI  Randall Hernandez, 42 y.o. male,  has a past medical history of Anemia in CKD (chronic kidney disease), Back pain, Blind, DM (diabetes mellitus), Gastroparesis, HTN (hypertension), Neuropathy, Obesity, Suprapubic catheter, and Urine retention. Randall Hernandez presents to office for a follow up visit for CKD4 related to severe diabetic nephropathy.  Diabetic retinopathy; followed by ENT; no current plans for laser surgery. He is not on ACEI/ARBS due to hyperkalemia and deteriorating renal function. Potassium currently WNL.  Fistula LLA.    Recently in hospital for hyperglycemia. DM remains uncontrolled.       Patient denies taking NSAIDs, new antibiotics or recreational drugs. Denies recent episode of dehydration, diarrhea, vomiting, acute illness, hospitalization, recent angiograms or exposure to IV radiocontrast.      Medical History:   Past Medical History:   Diagnosis Date    Anemia in CKD (chronic kidney disease)     Back pain     Blind     DM (diabetes mellitus)     Gastroparesis     HTN (hypertension)     Neuropathy     Obesity     Suprapubic catheter     Urine retention        Surgical History:   Past Surgical History:   Procedure Laterality Date    AV FISTULA PLACEMENT      CHOLECYSTECTOMY      DEBRIDEMENT      ESOPHAGOGASTRODUODENOSCOPY N/A 10/5/2022    Procedure: EGD;  Surgeon: Colby Meier MD;  Location: [facility] ENDOSCOPY;  Service: Gastroenterology;  Laterality: N/A;    INSERTION OF SUPRAPUBIC CATHETER         Family History:   Family History   Problem Relation Age of Onset    Arrhythmia Mother     Hypertension Mother     Arrhythmia Father        Social History:   Social History     Tobacco Use    Smoking status: Every Day     Types: Cigarettes    Smokeless tobacco: Never   Substance Use Topics    Alcohol use: Never       Allergies:  Review of patient's allergies indicates:   Allergen Reactions    Ivp dye [iodinated contrast media]     Metoclopramide       Other reaction(s): weakness    Ceftriaxone Rash     Other reaction(s): Other (See Comments)  Joint pain, chest tightness, numbness: hands, feet, throat, blurred vision, lightheaded   Other reaction(s): Other (See Comments)  Joint pain, chest tightness, numbness: hands, feet, throat, blurred vision, lightheaded   Joint pain, chest tightness, numbness: hands, feet, throat, blurred vision, lightheaded   Other reaction(s): Other (See Comments)  Joint pain, chest tightness, numbness: hands, feet, throat, blurred vision, lightheaded          Medications:    Current Outpatient Medications:     amLODIPine (NORVASC) 5 MG tablet, Take 1 tablet (5 mg total) by mouth once daily., Disp: 30 tablet, Rfl: 11    ascorbic acid, vitamin C, (VITAMIN C) 1000 MG tablet, Take 1,000 mg by mouth once daily., Disp: , Rfl:     carvediloL (COREG) 12.5 MG tablet, Take 1 tablet (12.5 mg total) by mouth 2 (two) times daily with meals., Disp: 60 tablet, Rfl: 11    diphenoxylate-atropine 2.5-0.025 mg (LOMOTIL) 2.5-0.025 mg per tablet, as needed., Disp: , Rfl:     EScitalopram oxalate (LEXAPRO) 20 MG tablet, Take 20 mg by mouth Daily., Disp: , Rfl:     famotidine (PEPCID) 20 MG tablet, Take 20 mg by mouth 2 (two) times daily., Disp: , Rfl:     gabapentin (NEURONTIN) 400 MG capsule, Take 400 mg by mouth 2 (two) times daily., Disp: , Rfl:     HYDROcodone-acetaminophen (NORCO)  mg per tablet, Take 1 tablet by mouth every 6 (six) hours as needed., Disp: , Rfl:     insulin glargine 100 units/mL (3mL) SubQ pen, Inject 6 Units into the skin Daily. And 6 units at night, Disp: , Rfl:     nitrofurantoin (MACRODANTIN) 100 MG capsule, Take 100 mg by mouth once daily., Disp: , Rfl:     NOVOLOG FLEXPEN U-100 INSULIN 100 unit/mL (3 mL) InPn pen, Inject into the skin 3 (three) times daily. Per sliding scale, Disp: , Rfl:     promethazine (PHENERGAN) 25 MG tablet, Take 25 mg by mouth every 6 (six) hours as needed., Disp: , Rfl:     senna (SENOKOT) 8.6 mg  "tablet, Take 17.2 mg by mouth as needed for Constipation., Disp: , Rfl:     traZODone (DESYREL) 100 MG tablet, Take 100-200 mg by mouth once daily., Disp: , Rfl:        ROS:    Constitutional: Denies fever, fatigue, generalized weakness, night sweats, or acute weight change, hard to control BS  Skin: Denies wounds, no rashes, no itching, no new skin lesions  HEENT: Denies acute change in hearing or vision, tinnitus, or dysphagia, decrease peripheral vision  Respiratory:  Denies cough, shortness of breath, or wheezing  Cardiovascular: Denies chest pain, palpitations, or swelling  Gastrointestional: Denies abdominal pain, nausea, vomiting, diarrhea, or constipation  Genitourinary: Denies dysuria, hematuria,, +++suprapubic tube  Musculoskeletal: Denies myalgias, back pain, decreased ROM or focal weakness  Neurological: Denies headaches, seizures, dizziness, paresthesias or weakness  Hematological: Denies unusual bruising or bleeding  Psychiatric: Denies hallucinations, depression, or confusion      Vital Signs:  /72 (BP Location: Right arm, Patient Position: Sitting)   Pulse 83   Temp 97.2 °F (36.2 °C) (Temporal)   Resp 20   Ht 5' 1" (1.549 m)   Wt 46.6 kg (102 lb 11.8 oz)   SpO2 99%   BMI 19.41 kg/m²   Body mass index is 19.41 kg/m².      Physical Exam:    General: no acute distress, awake, alert,   Eyes: PERRLA, EOMI, conjunctiva clear, eyelids without swelling, dec Peripheral vision  HENT: atraumatic, oropharynx and nasal mucosa patent  Neck: full ROM, no JVD, no thyromegaly or lymphadenopathy  Respiratory: equal, unlabored, clear to auscultation A/P  Cardiovascular: RRR without murmur or rub; radial and pedal pulses felt  Edema: none  Gastrointestinal: soft, non-tender, non-distended; positive bowel sounds; no masses to palpation  Genitourinary: ++suprapubic catheter  Musculoskeletal: full ROM without limitation or discomfort  Integumentary: warm, dry; no rashes, wounds, or skin lesions  Neurological: " oriented, appropriate, no acute deficits      Labs:        Component Value Date/Time     10/10/2022 1058     10/09/2022 0335    K 4.6 10/10/2022 1058    K 4.4 10/09/2022 0335    CO2 26 10/10/2022 1058    CO2 23 10/09/2022 0335    BUN 35.7 (H) 10/10/2022 1058    BUN 34.1 (H) 10/09/2022 0335    CREATININE 2.85 (H) 10/10/2022 1058    CREATININE 2.55 (H) 10/09/2022 0335    EGFRNONAA 29 12/13/2021 1636    EGFRNONAA 31 09/02/2021 0924    CALCIUM 8.8 10/10/2022 1058    CALCIUM 8.2 (L) 10/09/2022 0335    PHOS 4.7 09/02/2021 0924    .0 (H) 09/02/2021 0924            Component Value Date/Time    WBC 5.8 10/10/2022 1057    WBC 6.5 10/09/2022 0335    HGB 12.5 (L) 10/10/2022 1057    HGB 12.2 (L) 10/09/2022 0335    HCT 37.7 (L) 10/10/2022 1057    HCT 36.8 (L) 10/09/2022 0335     10/10/2022 1057     10/09/2022 0335         Impression:    Patient Active Problem List   Diagnosis    Hematemesis with nausea     1. CKD (chronic kidney disease) stage 4, GFR 15-29 ml/min        2. Hypertension, unspecified type        3. Proteinuria, unspecified type        4. Hyperkalemia        5. DM (diabetes mellitus) with complications          CKD4 related to severe diabetic nephropathy; trend as expected for CKD4  Fistula Left lower forearm for preparation of HD  LUE edema, pt to FU vascular ( Sp angioplasty of AVF)  Recently in hospital for hyperglycemia. DM remains uncontrolled. Pt hesitant to take more insulin due to hypoglycemia episodes  Secondary hyperparathyroidism-- will start Vit D analogue   Hypocalcemia  Hyponatremia-- corrected for hyperglycemia      Plan:  Increase latus to 10 units at night  Rx: Calcitriol 0.25 mcg daily    Fu in 2 months    Avoid NSAIDs (Aleve, Mobic, Celebrex, Ibuprofen, Advil, Toradol and Diclofenac).       Patric Watkins

## 2023-01-09 PROBLEM — K92.0 HEMATEMESIS WITH NAUSEA: Status: RESOLVED | Noted: 2022-10-05 | Resolved: 2023-01-09

## 2023-01-30 ENCOUNTER — TELEPHONE (OUTPATIENT)
Dept: NEPHROLOGY | Facility: CLINIC | Age: 43
End: 2023-01-30

## 2023-01-30 ENCOUNTER — OFFICE VISIT (OUTPATIENT)
Dept: NEPHROLOGY | Facility: CLINIC | Age: 43
End: 2023-01-30
Payer: MEDICARE

## 2023-01-30 VITALS
DIASTOLIC BLOOD PRESSURE: 80 MMHG | SYSTOLIC BLOOD PRESSURE: 136 MMHG | HEART RATE: 76 BPM | TEMPERATURE: 97 F | WEIGHT: 98.13 LBS | OXYGEN SATURATION: 96 % | RESPIRATION RATE: 20 BRPM | HEIGHT: 61 IN | BODY MASS INDEX: 18.53 KG/M2

## 2023-01-30 DIAGNOSIS — I10 PRIMARY HYPERTENSION: ICD-10-CM

## 2023-01-30 DIAGNOSIS — Z76.89 ENCOUNTER TO ESTABLISH CARE: Primary | ICD-10-CM

## 2023-01-30 DIAGNOSIS — R80.1 PERSISTENT PROTEINURIA: ICD-10-CM

## 2023-01-30 DIAGNOSIS — E87.5 HYPERKALEMIA: ICD-10-CM

## 2023-01-30 DIAGNOSIS — E11.8 DM (DIABETES MELLITUS) WITH COMPLICATIONS: ICD-10-CM

## 2023-01-30 DIAGNOSIS — N25.81 SECONDARY HYPERPARATHYROIDISM OF RENAL ORIGIN: ICD-10-CM

## 2023-01-30 DIAGNOSIS — N18.4 CKD (CHRONIC KIDNEY DISEASE) STAGE 4, GFR 15-29 ML/MIN: Primary | ICD-10-CM

## 2023-01-30 PROCEDURE — 99214 OFFICE O/P EST MOD 30 MIN: CPT | Mod: S$GLB,,,

## 2023-01-30 PROCEDURE — 99214 PR OFFICE/OUTPT VISIT, EST, LEVL IV, 30-39 MIN: ICD-10-PCS | Mod: S$GLB,,,

## 2023-01-30 PROCEDURE — 3079F DIAST BP 80-89 MM HG: CPT | Mod: CPTII,S$GLB,,

## 2023-01-30 PROCEDURE — 3008F BODY MASS INDEX DOCD: CPT | Mod: CPTII,S$GLB,,

## 2023-01-30 PROCEDURE — 3079F PR MOST RECENT DIASTOLIC BLOOD PRESSURE 80-89 MM HG: ICD-10-PCS | Mod: CPTII,S$GLB,,

## 2023-01-30 PROCEDURE — 3008F PR BODY MASS INDEX (BMI) DOCUMENTED: ICD-10-PCS | Mod: CPTII,S$GLB,,

## 2023-01-30 PROCEDURE — 3075F SYST BP GE 130 - 139MM HG: CPT | Mod: CPTII,S$GLB,,

## 2023-01-30 PROCEDURE — 3066F PR DOCUMENTATION OF TREATMENT FOR NEPHROPATHY: ICD-10-PCS | Mod: CPTII,S$GLB,,

## 2023-01-30 PROCEDURE — 3066F NEPHROPATHY DOC TX: CPT | Mod: CPTII,S$GLB,,

## 2023-01-30 PROCEDURE — 1159F PR MEDICATION LIST DOCUMENTED IN MEDICAL RECORD: ICD-10-PCS | Mod: CPTII,S$GLB,,

## 2023-01-30 PROCEDURE — 1159F MED LIST DOCD IN RCRD: CPT | Mod: CPTII,S$GLB,,

## 2023-01-30 PROCEDURE — 3075F PR MOST RECENT SYSTOLIC BLOOD PRESS GE 130-139MM HG: ICD-10-PCS | Mod: CPTII,S$GLB,,

## 2023-01-30 PROCEDURE — 99999 PR PBB SHADOW E&M-EST. PATIENT-LVL IV: CPT | Mod: PBBFAC,,,

## 2023-01-30 PROCEDURE — 99999 PR PBB SHADOW E&M-EST. PATIENT-LVL IV: ICD-10-PCS | Mod: PBBFAC,,,

## 2023-01-30 NOTE — TELEPHONE ENCOUNTER
Called Dr Echavarria  office and spoke with nurse regarding patients fistula to left arm, Dr Watkins wants patient to be seen sooner than March to check if fistula is ready to use. Nurse stated that she will call patient to schedule.

## 2023-01-30 NOTE — PROGRESS NOTES
[facility] [place] [facility] Ambulatory Progress Note      HPI  Randall Hernandez, 43 y.o. male, presents to office for a follow up visit for CKD4 related to severe diabetic nephropathy. Diabetic retinopathy; followed by ENT; no current plans for laser surgery. He is not on ACEI/ARBS due to hyperkalemia and deteriorating renal function.   Fistula LLA.    Mother reports pt has been having mini strokes; he is currently not on ASA.    Blood sugar uncontrolled; A1C 11.    He has had physical deterioration and mother is having to stay home with him 24/7    Patient denies taking NSAIDs or new antibiotics. Also denies recent episode of dehydration, diarrhea, vomiting, acute illness, hospitalization, recent angiograms or exposure to IV radiocontrast.        Medical Diagnoses:      Diagnosis Date    Anemia in CKD (chronic kidney disease)     Back pain     Blind     DM (diabetes mellitus)     Gastroparesis     HTN (hypertension)     Neuropathy     Obesity     Suprapubic catheter     Urine retention      Patient Active Problem List   Diagnosis   (none) - all problems resolved or deleted       Surgical History:   Past Surgical History:   Procedure Laterality Date    AV FISTULA PLACEMENT      CHOLECYSTECTOMY      DEBRIDEMENT      ESOPHAGOGASTRODUODENOSCOPY N/A 10/5/2022    Procedure: EGD;  Surgeon: Colby Meier MD;  Location: [facility] ENDOSCOPY;  Service: Gastroenterology;  Laterality: N/A;    INSERTION OF SUPRAPUBIC CATHETER         Family History:   Family History   Problem Relation Age of Onset    Arrhythmia Mother     Hypertension Mother     Arrhythmia Father        Social History:   Social History     Tobacco Use    Smoking status: Every Day     Types: Cigarettes    Smokeless tobacco: Never   Substance Use Topics    Alcohol use: Never       Allergies:  Review of patient's allergies indicates:   Allergen Reactions    Ivp dye [iodinated contrast media]     Metoclopramide      Other reaction(s): weakness    Ceftriaxone Rash     Other  reaction(s): Other (See Comments)  Joint pain, chest tightness, numbness: hands, feet, throat, blurred vision, lightheaded   Other reaction(s): Other (See Comments)  Joint pain, chest tightness, numbness: hands, feet, throat, blurred vision, lightheaded   Joint pain, chest tightness, numbness: hands, feet, throat, blurred vision, lightheaded   Other reaction(s): Other (See Comments)  Joint pain, chest tightness, numbness: hands, feet, throat, blurred vision, lightheaded          Medications:    Current Outpatient Medications:     amLODIPine (NORVASC) 5 MG tablet, Take 1 tablet (5 mg total) by mouth once daily., Disp: 30 tablet, Rfl: 11    ascorbic acid, vitamin C, (VITAMIN C) 1000 MG tablet, Take 1,000 mg by mouth once daily., Disp: , Rfl:     calcitRIOL (ROCALTROL) 0.25 MCG Cap, Take 1 capsule (0.25 mcg total) by mouth once daily., Disp: 90 capsule, Rfl: 3    carvediloL (COREG) 12.5 MG tablet, Take 1 tablet (12.5 mg total) by mouth 2 (two) times daily with meals., Disp: 60 tablet, Rfl: 11    diphenoxylate-atropine 2.5-0.025 mg (LOMOTIL) 2.5-0.025 mg per tablet, as needed., Disp: , Rfl:     EScitalopram oxalate (LEXAPRO) 20 MG tablet, Take 20 mg by mouth Daily., Disp: , Rfl:     famotidine (PEPCID) 20 MG tablet, Take 20 mg by mouth 2 (two) times daily., Disp: , Rfl:     gabapentin (NEURONTIN) 400 MG capsule, Take 400 mg by mouth 2 (two) times daily., Disp: , Rfl:     HYDROcodone-acetaminophen (NORCO)  mg per tablet, Take 1 tablet by mouth every 6 (six) hours as needed., Disp: , Rfl:     insulin glargine 100 units/mL (3mL) SubQ pen, Inject 8 Units into the skin Daily. And 10 units at night, Disp: , Rfl:     nitrofurantoin (MACRODANTIN) 100 MG capsule, Take 100 mg by mouth once daily., Disp: , Rfl:     NOVOLOG FLEXPEN U-100 INSULIN 100 unit/mL (3 mL) InPn pen, Inject into the skin 3 (three) times daily. Per sliding scale, Disp: , Rfl:     promethazine (PHENERGAN) 25 MG tablet, Take 25 mg by mouth every 6 (six)  "hours as needed., Disp: , Rfl:     senna (SENOKOT) 8.6 mg tablet, Take 17.2 mg by mouth as needed for Constipation., Disp: , Rfl:     traZODone (DESYREL) 100 MG tablet, Take 100-200 mg by mouth once daily., Disp: , Rfl:        Review of Systems:    Constitutional: Denies fever, fatigue, +generalized weakness; +decrease peripheral vision (chronic)  Skin: Denies wounds, no rashes, no itching, no new skin lesions  Respiratory:  Denies cough, shortness of breath, or wheezing  Cardiovascular: Denies chest pain, palpitations, or swelling  Gastrointestional: Denies abdominal pain, nausea, vomiting, diarrhea, or constipation  Genitourinary: suprapubic cath  Musculoskeletal: Denies back or flank pain; using walker  Neurological: Denies headaches, dizziness, paresthesias, tremors or focal weakness      Vital Signs:  /80 (BP Location: Right arm, Patient Position: Sitting)   Pulse 76   Temp 97.3 °F (36.3 °C) (Temporal)   Resp 20   Ht 5' 1" (1.549 m)   Wt 44.5 kg (98 lb 1.7 oz)   SpO2 96%   BMI 18.54 kg/m²   Body mass index is 18.54 kg/m².      Physical Exam:    General: no acute distress, awake, alert  Eyes: PERRLA, conjunctiva clear, eyelids without swelling  HENT: atraumatic, oropharynx and nasal mucosa patent  Neck: supple, trache midline, full ROM, no JVD, no thyromegaly or lymphadenopathy  Respiratory: equal, unlabored, clear to auscultation A/P  Cardiovascular: RRR without rub; radial and pedal pulses +2 BL  Edema: none  Gastrointestinal: soft, non-tender, non-distended; positive bowel sounds; no masses to palpation; no ascites  Genitourinary: suprapubic cath  Musculoskeletal: using walker for weakness  Integumentary: warm, dry; no rashes, wounds, or skin lesions  Neurological: oriented x4, appropriate, no acute deficits; no asterixis  Dialysis access: LLAF; not yet mature; swelling to L lower arm noted      Labs:    See media tab for most recent labs 1/20/23  Sodium 140   Potassium 4.6   Chloride 106   CO2 " 25   Creatinine 3.28   BUN 31   GFR 21   Glucose 182   Calcium 8.6   A1c 11.7   Hemoglobin 12.5   Hematocrit 40.9   Phosphorus 4.8   Proteinuria greater than 8 g/24 hours             Component Value Date/Time     10/10/2022 1058     10/09/2022 0335    K 4.6 10/10/2022 1058    K 4.4 10/09/2022 0335    CO2 26 10/10/2022 1058    CO2 23 10/09/2022 0335    BUN 35.7 (H) 10/10/2022 1058    BUN 34.1 (H) 10/09/2022 0335    CREATININE 2.85 (H) 10/10/2022 1058    CREATININE 2.55 (H) 10/09/2022 0335    CREATININE 2.63 (H) 10/08/2022 1117    CREATININE 2.34 (H) 10/07/2022 0409    CALCIUM 8.8 10/10/2022 1058    CALCIUM 8.2 (L) 10/09/2022 0335    PHOS 4.7 09/02/2021 0924    .0 (H) 09/02/2021 0924         Component Value Date/Time    WBC 5.8 10/10/2022 1057    WBC 6.5 10/09/2022 0335    HGB 12.5 (L) 10/10/2022 1057    HGB 12.2 (L) 10/09/2022 0335    HCT 37.7 (L) 10/10/2022 1057    HCT 36.8 (L) 10/09/2022 0335     10/10/2022 1057     10/09/2022 0335       Urine Creatinine   Date Value Ref Range Status   09/02/2021 139.1 58.0 - 161.0 mg/dL Final       Urine Protein Level   Date Value Ref Range Status   09/02/2021 739.9 mg/dL Final         Impression:    1. CKD (chronic kidney disease) stage 4, GFR 15-29 ml/min  Related to severe diabetic nephropathy; some minor decline in renal function as expected with progression of disease and uncontrolled DM; creatinine increased to 3.2 with GFR of 21.       2. Primary hypertension  Controlled; continue current medications (CCB, BB)      3. Persistent proteinuria  8 g/24 hours quantitative proteinuria.  Related to severe diabetic nephropathy      4. Hyperkalemia  Resolved      5. DM (diabetes mellitus) with complications  Uncontrolled.  Most recent A1c 11.7.  Patient is not compliant with medications; will try to get pt set up with local PCP to manage DM better      6. Secondary hyperparathyroidism of renal origin  Patient placed on vitamin-D analog,  calcitriol at last visit.  PTH is trending down, see labs.  Continue calcitriol        Need FU with vascular soon for left arm swelling, especially with possible need for HD in near future  Need local PCP    Plan:    Follow up in 2 months with labs within the week before.    Patient to call our office with any concerns prior to next appointment.    Avoid NSAIDs (Aleve, Mobic, Celebrex, Ibuprofen, Advil, Toradol and Diclofenac).  Only take tylenol occasionally if needed for aches/pains.    Educated on low sodium diet:  Avoid high salt foods (olives, pickles, smoked meats, deli meats, salted potato chips, etc.).   Do not add salt to your food at the table.   Use only small amounts of salt when cooking.    Recommended Daily Protein Intake for chronic kidney disease:  0.8 g/kg    Avoid excessive intake of high potassium foods  Bananas, oranges, watermelon, tomatoes, potatoes, or salt substitutes    BRITTNEY Whiting

## 2023-03-23 ENCOUNTER — TELEPHONE (OUTPATIENT)
Dept: NEPHROLOGY | Facility: CLINIC | Age: 43
End: 2023-03-23
Payer: MEDICARE

## 2023-03-23 DIAGNOSIS — N39.0 UTI (URINARY TRACT INFECTION), UNCOMPLICATED: Primary | ICD-10-CM

## 2023-03-23 RX ORDER — LEVOFLOXACIN 250 MG/1
250 TABLET ORAL DAILY
Qty: 7 TABLET | Refills: 0 | Status: SHIPPED | OUTPATIENT
Start: 2023-03-23 | End: 2023-03-23

## 2023-03-23 NOTE — TELEPHONE ENCOUNTER
Urine culture results reviewed, Dr Watkins ordered for levaquin 250 mg daily times 5 days, spoke to pharmacist at [facility] pharmacy to change original order, Called mother Bessie with new orders. Verbalized understanding.

## 2023-03-23 NOTE — TELEPHONE ENCOUNTER
Pharmacy sent drug interaction between Levaquin and Trazodone, Dr Watkins ordered to wait for final culture of urine, Called and spoke with Bessie mother regarding this. Verbalized understanding.

## 2023-03-23 NOTE — TELEPHONE ENCOUNTER
Called and spoke with mother Bessie regarding lab results, Patient has UTI, Dr Watkins ordered Levaquin 250 mg daily times 7 days, sent to pharmacy on file. Verbalized understanding.

## 2023-03-27 ENCOUNTER — OFFICE VISIT (OUTPATIENT)
Dept: NEPHROLOGY | Facility: CLINIC | Age: 43
End: 2023-03-27
Payer: MEDICARE

## 2023-03-27 VITALS
RESPIRATION RATE: 20 BRPM | OXYGEN SATURATION: 98 % | DIASTOLIC BLOOD PRESSURE: 70 MMHG | WEIGHT: 91.63 LBS | SYSTOLIC BLOOD PRESSURE: 124 MMHG | TEMPERATURE: 98 F | HEIGHT: 61 IN | HEART RATE: 99 BPM | BODY MASS INDEX: 17.3 KG/M2

## 2023-03-27 DIAGNOSIS — E11.8 DM (DIABETES MELLITUS) WITH COMPLICATIONS: ICD-10-CM

## 2023-03-27 DIAGNOSIS — N18.4 CKD (CHRONIC KIDNEY DISEASE) STAGE 4, GFR 15-29 ML/MIN: Primary | ICD-10-CM

## 2023-03-27 DIAGNOSIS — E87.5 HYPERKALEMIA: ICD-10-CM

## 2023-03-27 DIAGNOSIS — R80.1 PERSISTENT PROTEINURIA: ICD-10-CM

## 2023-03-27 DIAGNOSIS — N25.81 SECONDARY HYPERPARATHYROIDISM OF RENAL ORIGIN: ICD-10-CM

## 2023-03-27 DIAGNOSIS — N39.0 UTI (URINARY TRACT INFECTION), UNCOMPLICATED: ICD-10-CM

## 2023-03-27 DIAGNOSIS — I10 PRIMARY HYPERTENSION: ICD-10-CM

## 2023-03-27 PROCEDURE — 3066F NEPHROPATHY DOC TX: CPT | Mod: CPTII,S$GLB,,

## 2023-03-27 PROCEDURE — 1159F MED LIST DOCD IN RCRD: CPT | Mod: CPTII,S$GLB,,

## 2023-03-27 PROCEDURE — 3078F PR MOST RECENT DIASTOLIC BLOOD PRESSURE < 80 MM HG: ICD-10-PCS | Mod: CPTII,S$GLB,,

## 2023-03-27 PROCEDURE — 3066F PR DOCUMENTATION OF TREATMENT FOR NEPHROPATHY: ICD-10-PCS | Mod: CPTII,S$GLB,,

## 2023-03-27 PROCEDURE — 3074F SYST BP LT 130 MM HG: CPT | Mod: CPTII,S$GLB,,

## 2023-03-27 PROCEDURE — 3078F DIAST BP <80 MM HG: CPT | Mod: CPTII,S$GLB,,

## 2023-03-27 PROCEDURE — 99214 PR OFFICE/OUTPT VISIT, EST, LEVL IV, 30-39 MIN: ICD-10-PCS | Mod: S$GLB,,,

## 2023-03-27 PROCEDURE — 99214 OFFICE O/P EST MOD 30 MIN: CPT | Mod: S$GLB,,,

## 2023-03-27 PROCEDURE — 3074F PR MOST RECENT SYSTOLIC BLOOD PRESSURE < 130 MM HG: ICD-10-PCS | Mod: CPTII,S$GLB,,

## 2023-03-27 PROCEDURE — 3008F PR BODY MASS INDEX (BMI) DOCUMENTED: ICD-10-PCS | Mod: CPTII,S$GLB,,

## 2023-03-27 PROCEDURE — 3008F BODY MASS INDEX DOCD: CPT | Mod: CPTII,S$GLB,,

## 2023-03-27 PROCEDURE — 99999 PR PBB SHADOW E&M-EST. PATIENT-LVL IV: ICD-10-PCS | Mod: PBBFAC,,,

## 2023-03-27 PROCEDURE — 99999 PR PBB SHADOW E&M-EST. PATIENT-LVL IV: CPT | Mod: PBBFAC,,,

## 2023-03-27 PROCEDURE — 1159F PR MEDICATION LIST DOCUMENTED IN MEDICAL RECORD: ICD-10-PCS | Mod: CPTII,S$GLB,,

## 2023-03-27 RX ORDER — LEVOFLOXACIN 250 MG/1
250 TABLET ORAL DAILY
COMMUNITY
Start: 2023-03-23

## 2023-03-27 NOTE — PROGRESS NOTES
[facility] [place] [facility] Ambulatory Progress Note      HPI  Randall Hernandez, 43 y.o. male, presents to office for a follow up visit for CKD4 related to severe diabetic nephropathy. Fistula LLA.  Blood sugar uncontrolled; A1C 11.     He has had physical deterioration and mother is having to stay home with him 24/7; mom reports he has been having no appetite and nausea and vomiting for the past 3-4 days.  He is only occasionally able to keep down Pedialyte or a milkshake.  He has lost some significant weight.  They do have a appointment with a primary care provider next week to establish care.  Patient denies tremors.  He has no edema.  No icterus.  His main complaint is nausea vomiting and generalized weakness.        Medical Diagnoses:   Past Medical History:   Diagnosis Date    Anemia in CKD (chronic kidney disease)     Back pain     Blind     DM (diabetes mellitus)     Gastroparesis     HTN (hypertension)     Neuropathy     Obesity     Suprapubic catheter     Urine retention      Patient Active Problem List   Diagnosis    CKD (chronic kidney disease) stage 4, GFR 15-29 ml/min    Primary hypertension    Persistent proteinuria    DM (diabetes mellitus) with complications    Secondary hyperparathyroidism of renal origin       Surgical History:   Past Surgical History:   Procedure Laterality Date    AV FISTULA PLACEMENT      CHOLECYSTECTOMY      DEBRIDEMENT      ESOPHAGOGASTRODUODENOSCOPY N/A 10/5/2022    Procedure: EGD;  Surgeon: Colby Meier MD;  Location: [facility] ENDOSCOPY;  Service: Gastroenterology;  Laterality: N/A;    INSERTION OF SUPRAPUBIC CATHETER         Family History:   Family History   Problem Relation Age of Onset    Arrhythmia Mother     Hypertension Mother     Arrhythmia Father        Social History:   Social History     Tobacco Use    Smoking status: Every Day     Types: Cigarettes     Passive exposure: Current    Smokeless tobacco: Never   Substance Use Topics    Alcohol use: Never        Allergies:  Review of patient's allergies indicates:   Allergen Reactions    Ivp dye [iodinated contrast media]     Metoclopramide      Other reaction(s): weakness    Ceftriaxone Rash     Other reaction(s): Other (See Comments)  Joint pain, chest tightness, numbness: hands, feet, throat, blurred vision, lightheaded   Other reaction(s): Other (See Comments)  Joint pain, chest tightness, numbness: hands, feet, throat, blurred vision, lightheaded   Joint pain, chest tightness, numbness: hands, feet, throat, blurred vision, lightheaded   Other reaction(s): Other (See Comments)  Joint pain, chest tightness, numbness: hands, feet, throat, blurred vision, lightheaded          Medications:    Current Outpatient Medications:     ascorbic acid, vitamin C, (VITAMIN C) 1000 MG tablet, Take 1,000 mg by mouth once daily., Disp: , Rfl:     calcitRIOL (ROCALTROL) 0.25 MCG Cap, Take 1 capsule (0.25 mcg total) by mouth once daily., Disp: 90 capsule, Rfl: 3    diphenoxylate-atropine 2.5-0.025 mg (LOMOTIL) 2.5-0.025 mg per tablet, as needed., Disp: , Rfl:     EScitalopram oxalate (LEXAPRO) 20 MG tablet, Take 20 mg by mouth Daily., Disp: , Rfl:     famotidine (PEPCID) 20 MG tablet, Take 20 mg by mouth 2 (two) times daily., Disp: , Rfl:     gabapentin (NEURONTIN) 400 MG capsule, Take 400 mg by mouth 2 (two) times daily., Disp: , Rfl:     HYDROcodone-acetaminophen (NORCO)  mg per tablet, Take 1 tablet by mouth every 6 (six) hours as needed., Disp: , Rfl:     levoFLOXacin (LEVAQUIN) 250 MG tablet, Take 250 mg by mouth once daily., Disp: , Rfl:     nitrofurantoin (MACRODANTIN) 100 MG capsule, Take 100 mg by mouth once daily., Disp: , Rfl:     promethazine (PHENERGAN) 25 MG tablet, Take 25 mg by mouth every 6 (six) hours as needed., Disp: , Rfl:     senna (SENOKOT) 8.6 mg tablet, Take 17.2 mg by mouth as needed for Constipation., Disp: , Rfl:     traZODone (DESYREL) 100 MG tablet, Take 100-200 mg by mouth once daily., Disp: ,  "Rfl:     amLODIPine (NORVASC) 5 MG tablet, Take 1 tablet (5 mg total) by mouth once daily. (Patient not taking: Reported on 3/27/2023), Disp: 30 tablet, Rfl: 11    carvediloL (COREG) 12.5 MG tablet, Take 1 tablet (12.5 mg total) by mouth 2 (two) times daily with meals. (Patient not taking: Reported on 3/27/2023), Disp: 60 tablet, Rfl: 11    insulin glargine 100 units/mL (3mL) SubQ pen, Inject 8 Units into the skin Daily. And 10 units at night, Disp: , Rfl:     NOVOLOG FLEXPEN U-100 INSULIN 100 unit/mL (3 mL) InPn pen, Inject into the skin 3 (three) times daily. Per sliding scale, Disp: , Rfl:        Review of Systems:    Constitutional: Denies fever; + generalized weakness and fatigue  Skin: Denies wounds, no rashes, no itching, no new skin lesions  Respiratory:  Denies cough, shortness of breath, or wheezing  Cardiovascular: Denies chest pain, palpitations, or swelling  Gastrointestional:  +Nausea, vomiting.  +No appetite  Genitourinary:  Suprapubic catheter  Musculoskeletal:  Requiring wheelchair for ambulation assistance.  Has had multiple falls if he tries to get up on his own  Neurological: Denies headaches, dizziness, paresthesias, tremors or focal weakness      Vital Signs:  /70 (BP Location: Left arm, Patient Position: Sitting)   Pulse 99   Temp 97.9 °F (36.6 °C) (Temporal)   Resp 20   Ht 5' 1" (1.549 m)   Wt 41.5 kg (91 lb 9.6 oz)   SpO2 98%   BMI 17.31 kg/m²   Body mass index is 17.31 kg/m².      Physical Exam:    General: no acute distress, awake, alert; + pale; + frail  Eyes: + chronic peripheral visual loss  HENT: atraumatic, oropharynx and nasal mucosa patent  Neck: supple, trache midline, full ROM, no JVD, no thyromegaly or lymphadenopathy  Respiratory: equal, unlabored, clear to auscultation A/P  Cardiovascular: RRR without rub; radial and pedal pulses +2 BL  Edema: none  Gastrointestinal: soft, non-tender, non-distended;  Genitourinary:  +Suprapubic catheter  Musculoskeletal:  " +Requiring wheelchair due to weakness and fall risk  Integumentary: warm, dry; no rashes, wounds, or skin lesions  Neurological: oriented x4, appropriate, no acute deficits; no asterixis      Labs:    See media tab for most recent labs 3/20/23   Hemoglobin 14.7   Hematocrit 47   Sodium 140   Potassium 5.0   Chloride 106   CO2 24   Creatinine 3.19   BUN 42   GFR 21  Glucose 167   Calcium 9.0   Phosphorus 4.4  PTH 97  Proteinuria greater than 5 g/24 hours (lab did not accurately measure urinary protein to calculate ratio)      1/20/23  Sodium 140   Potassium 4.6   Chloride 106   CO2 25   Creatinine 3.28   BUN 31   GFR 21   Glucose 182   Calcium 8.6   A1c 11.7   Hemoglobin 12.5   Hematocrit 40.9   Phosphorus 4.8   Proteinuria greater than 8 g/24 hours         Impression:    1. CKD (chronic kidney disease) stage 4, GFR 15-29 ml/min  Related to severe diabetic nephropathy.  Renal function remains the same as 2 months ago.    He does have a left lower arm fistula placed for preparation of dialysis in the future.  Last visit we did advised that he see his vascular surgeon due to swelling in arm; he did see Dr. Hurst who reports he is status post fistulogram with outflow angioplasty which may be secondary to TOS.  Dr. Hurst states his left upper extremity fistula is suitable for cannulation if needed.    He is no acute indications for dialysis at this time.      2. Primary hypertension  Maintained on calcium channel blocker and beta-blocker.  Unable to tolerate ACE inhibitor or Arb due to hyperkalemia and renal function deterioration      3. Persistent proteinuria  Related to severe diabetic nephropathy.  Unable to tolerate ACE or Arb      4. Hyperkalemia  Potassium on higher side of normal.  Recommend low-potassium diet      5. DM (diabetes mellitus) with complications  Uncontrolled.  Patient is noncompliant with medications.  Last visit we advised patient and mother that he gets with the primary care provider's we  can have better management of blood sugar.  He will be seeing a primary care provider next week    Mother reports due to his lack of appetite his blood sugars have been between 100 to 200s.  She did have to call ambulance last night due to hypoglycemia.    Patient is also having severe symptoms of diabetic gastroparesis; I advised that they go to the emergency room for IV hydration as he has not been able to keep fluids or food down for 3-4 days.       6. Secondary hyperparathyroidism of renal origin  PTH trending down on vitamin-D analog   7. Urinary tract infection.  Patient placed on antibiotics last week to complete 5 day course.  Denies fever         Plan:  Follow up in  2 months with labs within the week before.  Continue current medication regimen    Patient to call our office with any concerns prior to next appointment.    Avoid NSAIDs (Aleve, Mobic, Celebrex, Ibuprofen, Advil, Toradol and Diclofenac).  Only take tylenol occasionally if needed for aches/pains.    Educated on low sodium diet:  Avoid high salt foods (olives, pickles, smoked meats, deli meats, salted potato chips, etc.).   Do not add salt to your food at the table.   Use only small amounts of salt when cooking.    Recommended Daily Protein Intake for chronic kidney disease:  0.8 g/kg    Avoid excessive intake of high potassium foods  Bananas, oranges, watermelon, tomatoes, potatoes, or salt substitutes    Duane LUGO

## 2023-05-15 ENCOUNTER — TELEPHONE (OUTPATIENT)
Dept: NEPHROLOGY | Facility: CLINIC | Age: 43
End: 2023-05-15
Payer: MEDICARE

## 2023-05-15 NOTE — TELEPHONE ENCOUNTER
Called mother Bessie about patients appt on 5-22-23, stated that patient is currently on Hospice. Appt cancelled

## 2023-06-26 PROBLEM — N39.0 UTI (URINARY TRACT INFECTION), UNCOMPLICATED: Status: RESOLVED | Noted: 2023-03-27 | Resolved: 2023-06-26

## (undated) DEVICE — SOL IRRI STRL WATER 1000ML

## (undated) DEVICE — KIT SURGICAL COLON .25 1.1OZ

## (undated) DEVICE — TUBING O2 FEMALE CONN 13FT

## (undated) DEVICE — TIP SUCTION YANKAUER

## (undated) DEVICE — COLLECTION SPECIMEN NEPTUNE

## (undated) DEVICE — KIT CANIST SUCTION 1200CC